# Patient Record
Sex: MALE | Race: WHITE
[De-identification: names, ages, dates, MRNs, and addresses within clinical notes are randomized per-mention and may not be internally consistent; named-entity substitution may affect disease eponyms.]

---

## 2017-03-09 ENCOUNTER — HOSPITAL ENCOUNTER (EMERGENCY)
Dept: HOSPITAL 47 - EC | Age: 27
Discharge: OTHER INSTITUTION | End: 2017-03-09
Payer: SELF-PAY

## 2017-03-09 VITALS — DIASTOLIC BLOOD PRESSURE: 84 MMHG | HEART RATE: 86 BPM | SYSTOLIC BLOOD PRESSURE: 109 MMHG

## 2017-03-09 VITALS — RESPIRATION RATE: 16 BRPM | TEMPERATURE: 97.3 F

## 2017-03-09 DIAGNOSIS — F17.200: ICD-10-CM

## 2017-03-09 DIAGNOSIS — Z88.0: ICD-10-CM
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
## 2017-03-09 DIAGNOSIS — S61.217A: Primary | ICD-10-CM

## 2017-03-09 PROCEDURE — 99283 EMERGENCY DEPT VISIT LOW MDM: CPT

## 2017-03-09 PROCEDURE — 12001 RPR S/N/AX/GEN/TRNK 2.5CM/<: CPT

## 2017-03-09 NOTE — ED
General Adult HPI





- General


Source: police, EMS


Mode of arrival: EMS


Limitations: no limitations





<Génesis Keller - Last Filed: 03/11/17 15:08>





- History of Present Illness


-: minutes(s)


Quality: sharp


Consistency: constant


Improves with: none


Worsens with: none





<Enmanuel Reece - Last Filed: 04/01/17 07:35>





- General


Chief complaint: Wound/Laceration


Stated complaint: laceration





- History of Present Illness


Initial comments: 





Patient is 26-year-old man who is in police custody.  He is brought to be 

evaluated for a laceration to his fifth digit sustained when he punched a window

, breaking it.  The patient denies foreign body sensation.  He states that he 

does have sensation to the digit. (Enmanuel Reece)





- Related Data


 Allergies











Allergy/AdvReac Type Severity Reaction Status Date / Time


 


Penicillins Allergy  Unknown Verified 03/09/17 02:45














Review of Systems


ROS Other: All systems not noted in ROS Statement are negative.





<Génesis Keller - Last Filed: 03/11/17 15:08>


ROS Other: All systems not noted in ROS Statement are negative.


Constitutional: Denies: fever, weakness


Respiratory: Denies: dyspnea


Cardiovascular: Denies: chest pain


Skin: Reports: as per HPI, other (Laceration)


Neurological: Denies: weakness, numbness, paresthesias





<Enmanuel Reece - Last Filed: 04/01/17 07:35>


ROS Statement: 


Those systems with pertinent positive or pertinent negative responses have been 

documented in the HPI.








Past Medical History


Past Medical History: No Reported History


History of Any Multi-Drug Resistant Organisms: None Reported


Past Surgical History: No Surgical Hx Reported


Past Psychological History: No Psychological Hx Reported


Smoking Status: Current every day smoker


Past Alcohol Use History: Occasional


Past Drug Use History: Marijuana





<Génesis Keller - Last Filed: 03/11/17 15:08>





General Exam


Limitations: no limitations





<Génesis Keller - Last Filed: 03/11/17 15:08>


General appearance: alert, in no apparent distress


Head exam: Present: atraumatic, normocephalic


  ** Left


Hand Wrist exam: Present: other (Laceration to the fifth digit.)


Neurosensory exam: Present: 2-point discrimination, radial nerve intact, ulnar 

nerve intact, median nerve intact


Vascular: Present: normal capillary refill, radial pulse (Normal), ulnar pulse (

Normal).  Absent: vascular compromise


Neurological exam: Present: alert.  Absent: motor sensory deficit


Skin exam: Present: other (Laceration as above)





<ChevyEnmanuel - Last Filed: 04/01/17 07:35>





Procedures





<Génesis Keller - Last Filed: 03/11/17 15:08>





<ChevyEnmanuel - Last Filed: 04/01/17 07:35>





- Procedures


Initial comment: 


The skin was anesthetized with 1% lidocaine. The laceration was then cleansed 

and irrigated with normal saline. The wound was inspected, and there was no 

evidence of injury to deep structures. No foreign body was noted in the wound. 

A total of 9 skin sutures were placed utilizing 5-0 Ethilon. Lacerations are 

approx 1 cm and 1 cm.





 (Génesis Keller)





Disposition





<Génesis Keller - Last Filed: 03/11/17 15:08>





- Out of Hospital Transfer - Req. Specs


Out of Hospital Transfer - Requested Specifics: Other Non-Acute (snf)





<ChevyEnmanuel - Last Filed: 04/01/17 07:35>


Clinical Impression: 


 Laceration





Disposition: OTHER INSTITUTION NOT DEFINED


Condition: Fair


Instructions:  Care For Your Stitches (ED), Laceration (ED)


Referrals: 


None,Stated [Primary Care Provider] - 1-2 days

## 2020-07-05 ENCOUNTER — HOSPITAL ENCOUNTER (EMERGENCY)
Dept: HOSPITAL 47 - EC | Age: 30
Discharge: HOME | End: 2020-07-05
Payer: COMMERCIAL

## 2020-07-05 VITALS — TEMPERATURE: 98.9 F

## 2020-07-05 VITALS — HEART RATE: 100 BPM | SYSTOLIC BLOOD PRESSURE: 145 MMHG | DIASTOLIC BLOOD PRESSURE: 94 MMHG

## 2020-07-05 VITALS — RESPIRATION RATE: 20 BRPM

## 2020-07-05 DIAGNOSIS — Z88.0: ICD-10-CM

## 2020-07-05 DIAGNOSIS — E86.0: ICD-10-CM

## 2020-07-05 DIAGNOSIS — F10.129: Primary | ICD-10-CM

## 2020-07-05 DIAGNOSIS — F17.200: ICD-10-CM
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
## 2020-07-05 PROCEDURE — 99284 EMERGENCY DEPT VISIT MOD MDM: CPT

## 2020-07-05 NOTE — ED
General Adult HPI





- General


Chief complaint: Recheck/Abnormal Lab/Rx


Stated complaint: Dehydrated


Time Seen by Provider: 07/05/20 12:50


Source: patient, RN notes reviewed, old records reviewed


Mode of arrival: ambulatory


Limitations: no limitations





- History of Present Illness


Initial comments: 





This is a 30-year-old male who presents emergency Department complaining that he

is extremely hung over thinks she's dehydrated.  Patient states she's very 

nauseated since been difficult to drink any water.  Patient does not want any IV

or needles to draw blood.  Patient states he just needs some for nausea.  

Patient denies any chest pain difficulty breathing first breath per patient 

denies any recent fever chills or cough.  Patient states he is doing outpatient 

rehab with [facility] but he is failing.





- Related Data


                                    Allergies











Allergy/AdvReac Type Severity Reaction Status Date / Time


 


Penicillins Allergy  Unknown Verified 07/05/20 12:52














Review of Systems


ROS Statement: 


Those systems with pertinent positive or pertinent negative responses have been 

documented in the HPI.





ROS Other: All systems not noted in ROS Statement are negative.





Past Medical History


Past Medical History: No Reported History


History of Any Multi-Drug Resistant Organisms: None Reported


Past Surgical History: No Surgical Hx Reported


Past Psychological History: No Psychological Hx Reported


Smoking Status: Current every day smoker


Past Alcohol Use History: Abuse, Heavy


Past Drug Use History: Marijuana





General Exam





- General Exam Comments


Initial Comments: 





GENERAL:


Patient is well-developed and well-nourished.  Patient is nontoxic and well-

hydrated and is in no acute distress.





ENT:


Neck is soft and supple.  No significant lymphadenopathy is noted.  Oropharynx 

is clear.  Dry mucous membranes.  Neck has full range of motion without 

eliciting any pain.  





EYES:


The sclera were anicteric and conjunctiva were pink and moist.  Extraocular 

movements were intact and pupils were equal round and reactive to light.  

Eyelids were unremarkable.





PULMONARY:


Unlabored respirations.  Good breath sounds bilaterally.  No audible rales 

rhonchi or wheezing was noted.





CARDIOVASCULAR:


There is a regular rate and rhythm without any murmurs gallops or rubs.  





ABDOMEN:


Soft and nontender with normal bowel sounds.  No palpable organomegaly was 

noted.  There is no palpable pulsatile mass.





SKIN:


Skin is clear with no lesions or rashes and otherwise unremarkable.





NEUROLOGIC:


Patient is alert and oriented x3.  Cranial nerves II through XII are grossly 

intact.  Motor and sensory are also intact.  Normal speech, volume and content. 

Symmetrical smile.  





MUSCULOSKELETAL:


Normal extremities with adequate strength and full range of motion.  





LYMPHATICS:


No significant lymphadenopathy is noted





PSYCHIATRIC:


Patient appears to be very anxious


Limitations: no limitations





Course





                                   Vital Signs











  07/05/20





  12:49


 


Temperature 98.9 F


 


Pulse Rate 104 H


 


Respiratory 18





Rate 


 


Blood Pressure 146/94


 


O2 Sat by Pulse 98





Oximetry 














Disposition


Clinical Impression: 


 Dehydration, Hangover, Alcohol abuse





Disposition: HOME SELF-CARE


Condition: Good


Instructions (If sedation given, give patient instructions):  Abuse of Alcohol 

(ED)


Is patient prescribed a controlled substance at d/c from ED?: No


Referrals: 


None,Stated [Primary Care Provider] - 1-2 days


Time of Disposition: 13:54

## 2021-04-21 ENCOUNTER — HOSPITAL ENCOUNTER (EMERGENCY)
Dept: HOSPITAL 47 - EC | Age: 31
Discharge: HOME | End: 2021-04-21
Payer: COMMERCIAL

## 2021-04-21 VITALS — HEART RATE: 108 BPM | DIASTOLIC BLOOD PRESSURE: 90 MMHG | SYSTOLIC BLOOD PRESSURE: 145 MMHG

## 2021-04-21 VITALS — TEMPERATURE: 98.7 F | RESPIRATION RATE: 18 BRPM

## 2021-04-21 DIAGNOSIS — F17.200: ICD-10-CM

## 2021-04-21 DIAGNOSIS — F12.90: ICD-10-CM

## 2021-04-21 DIAGNOSIS — Z88.0: ICD-10-CM

## 2021-04-21 DIAGNOSIS — F32.9: ICD-10-CM

## 2021-04-21 DIAGNOSIS — F10.139: Primary | ICD-10-CM

## 2021-04-21 DIAGNOSIS — Z20.822: ICD-10-CM

## 2021-04-21 DIAGNOSIS — F41.9: ICD-10-CM

## 2021-04-21 LAB
ALBUMIN SERPL-MCNC: 4.8 G/DL (ref 3.5–5)
ALP SERPL-CCNC: 98 U/L (ref 38–126)
ALT SERPL-CCNC: 182 U/L (ref 4–49)
AMYLASE SERPL-CCNC: 83 U/L (ref 30–110)
ANION GAP SERPL CALC-SCNC: 22 MMOL/L
AST SERPL-CCNC: 397 U/L (ref 17–59)
BASOPHILS # BLD AUTO: 0 K/UL (ref 0–0.2)
BASOPHILS NFR BLD AUTO: 0 %
BUN SERPL-SCNC: 21 MG/DL (ref 9–20)
CALCIUM SPEC-MCNC: 9.4 MG/DL (ref 8.4–10.2)
CHLORIDE SERPL-SCNC: 101 MMOL/L (ref 98–107)
CO2 SERPL-SCNC: 14 MMOL/L (ref 22–30)
EOSINOPHIL # BLD AUTO: 0 K/UL (ref 0–0.7)
EOSINOPHIL NFR BLD AUTO: 1 %
ERYTHROCYTE [DISTWIDTH] IN BLOOD BY AUTOMATED COUNT: 5.34 M/UL (ref 4.3–5.9)
ERYTHROCYTE [DISTWIDTH] IN BLOOD: 12.4 % (ref 11.5–15.5)
GLUCOSE SERPL-MCNC: 109 MG/DL (ref 74–99)
HCT VFR BLD AUTO: 50.9 % (ref 39–53)
HGB BLD-MCNC: 18.4 GM/DL (ref 13–17.5)
LIPASE SERPL-CCNC: 302 U/L (ref 23–300)
LYMPHOCYTES # SPEC AUTO: 1.2 K/UL (ref 1–4.8)
LYMPHOCYTES NFR SPEC AUTO: 15 %
MCH RBC QN AUTO: 34.4 PG (ref 25–35)
MCHC RBC AUTO-ENTMCNC: 36.1 G/DL (ref 31–37)
MCV RBC AUTO: 95.4 FL (ref 80–100)
MONOCYTES # BLD AUTO: 0.5 K/UL (ref 0–1)
MONOCYTES NFR BLD AUTO: 6 %
NEUTROPHILS # BLD AUTO: 6.1 K/UL (ref 1.3–7.7)
NEUTROPHILS NFR BLD AUTO: 77 %
PLATELET # BLD AUTO: 315 K/UL (ref 150–450)
POTASSIUM SERPL-SCNC: 4.1 MMOL/L (ref 3.5–5.1)
PROT SERPL-MCNC: 8.5 G/DL (ref 6.3–8.2)
SODIUM SERPL-SCNC: 137 MMOL/L (ref 137–145)
WBC # BLD AUTO: 7.9 K/UL (ref 3.8–10.6)

## 2021-04-21 PROCEDURE — 87635 SARS-COV-2 COVID-19 AMP PRB: CPT

## 2021-04-21 PROCEDURE — 82150 ASSAY OF AMYLASE: CPT

## 2021-04-21 PROCEDURE — 96376 TX/PRO/DX INJ SAME DRUG ADON: CPT

## 2021-04-21 PROCEDURE — 80320 DRUG SCREEN QUANTALCOHOLS: CPT

## 2021-04-21 PROCEDURE — 85025 COMPLETE CBC W/AUTO DIFF WBC: CPT

## 2021-04-21 PROCEDURE — 83690 ASSAY OF LIPASE: CPT

## 2021-04-21 PROCEDURE — 80053 COMPREHEN METABOLIC PANEL: CPT

## 2021-04-21 PROCEDURE — 96374 THER/PROPH/DIAG INJ IV PUSH: CPT

## 2021-04-21 PROCEDURE — 96375 TX/PRO/DX INJ NEW DRUG ADDON: CPT

## 2021-04-21 PROCEDURE — 96361 HYDRATE IV INFUSION ADD-ON: CPT

## 2021-04-21 PROCEDURE — 36415 COLL VENOUS BLD VENIPUNCTURE: CPT

## 2021-04-21 PROCEDURE — 99284 EMERGENCY DEPT VISIT MOD MDM: CPT

## 2021-04-21 NOTE — ED
General Adult HPI





- General


Chief complaint: Nausea/Vomiting/Diarrhea


Stated complaint: N/V/D


Time Seen by Provider: 04/21/21 11:38


Source: patient, RN notes reviewed


Mode of arrival: ambulatory


Limitations: no limitations





- History of Present Illness


Initial comments: 





31-year-old male with a past medical history of heavy alcohol abuse, marijuana 

use presents to the emergency room for a little nausea vomiting.  Patient states

he has had nausea and vomiting for the past 3 days.  He states that his drinking

is probably not helped.  States he usually sticks to drinking on the weekends 

but has drank the last 2 nights.  States of complaint of vodka last night.  

Patient denies any abdominal pain.  He does admit to a few episodes of diarrhea.

Patient has no other complaints at this time including shortness of breath, 

chest pain, abdominal pain, headache, or visual changes.





- Related Data


                                Home Medications











 Medication  Instructions  Recorded  Confirmed


 


Citalopram Hydrobromide 20 mg PO HS 04/21/21 04/21/21





[Citalopram HBr]   


 


atenoloL [Atenolol] 25 mg PO DAILY 04/21/21 04/21/21








                                  Previous Rx's











 Medication  Instructions  Recorded


 


Ondansetron [Zofran ODT] 4 mg PO Q8HR PRN #15 tab 04/21/21











                                    Allergies











Allergy/AdvReac Type Severity Reaction Status Date / Time


 


Penicillins Allergy  Unknown Verified 04/21/21 12:40














Review of Systems


ROS Statement: 


Those systems with pertinent positive or pertinent negative responses have been 

documented in the HPI.





ROS Other: All systems not noted in ROS Statement are negative.





Past Medical History


Past Medical History: No Reported History


History of Any Multi-Drug Resistant Organisms: None Reported


Past Surgical History: No Surgical Hx Reported


Past Psychological History: Anxiety, Depression


Smoking Status: Current every day smoker


Past Alcohol Use History: Abuse, Heavy


Past Drug Use History: Marijuana





General Exam


Limitations: no limitations


General appearance: alert, in no apparent distress


Head exam: Present: atraumatic, normocephalic, normal inspection


Eye exam: Present: normal appearance, PERRL, EOMI.  Absent: scleral icterus, 

conjunctival injection, periorbital swelling


ENT exam: Present: normal exam, mucous membranes moist


Neck exam: Present: normal inspection.  Absent: tenderness, meningismus, 

lymphadenopathy


Respiratory exam: Present: normal lung sounds bilaterally.  Absent: respiratory 

distress, wheezes, rales, rhonchi, stridor


Cardiovascular Exam: Present: regular rate, normal rhythm, normal heart sounds. 

Absent: systolic murmur, diastolic murmur, rubs, gallop, clicks


GI/Abdominal exam: Present: soft, normal bowel sounds.  Absent: distended, 

tenderness, guarding, rebound, rigid





Course


                                   Vital Signs











  04/21/21 04/21/21 04/21/21





  11:34 12:37 13:00


 


Temperature 98.7 F  


 


Pulse Rate 137 H 116 H 120 H


 


Respiratory 18 18 18





Rate   


 


Blood Pressure 134/91 139/105 141/102


 


O2 Sat by Pulse 96 96 96





Oximetry   














Medical Decision Making





- Medical Decision Making





Patient presents with tachycardia.  This is likely secondary to alcohol 

withdrawals.  Laboratory evaluation was obtained.  CBC does show 

hemoconcentration secondary to dehydration.  CMP also reveals evidence of dehydr

ation.  Transaminitis is likely secondary to alcohol abuse. AG of 22 likely 

related to alcoholic ketosis. Serum alcohol today is 152.  Patient was monitored

in the ER for over 3 hours.  He was given 2 L of fluid.  He was given 

antiemetics.  The vomiting has subsided.  Patient was given Ativan and Valium 

for withdrawal symptoms.  At this time patient is stable for discharge home.  

Will return here for any worsening symptoms.





I discussed this case with attending Dr. trent who agrees with this assessment 

and treatment plan.





- Lab Data


Result diagrams: 


                                 04/21/21 11:58





                                 04/21/21 11:58


                                   Lab Results











  04/21/21 04/21/21 04/21/21 Range/Units





  11:49 11:58 11:58 


 


WBC   7.9   (3.8-10.6)  k/uL


 


RBC   5.34   (4.30-5.90)  m/uL


 


Hgb   18.4 H   (13.0-17.5)  gm/dL


 


Hct   50.9   (39.0-53.0)  %


 


MCV   95.4   (80.0-100.0)  fL


 


MCH   34.4   (25.0-35.0)  pg


 


MCHC   36.1   (31.0-37.0)  g/dL


 


RDW   12.4   (11.5-15.5)  %


 


Plt Count   315   (150-450)  k/uL


 


MPV   7.1   


 


Neutrophils %   77   %


 


Lymphocytes %   15   %


 


Monocytes %   6   %


 


Eosinophils %   1   %


 


Basophils %   0   %


 


Neutrophils #   6.1   (1.3-7.7)  k/uL


 


Lymphocytes #   1.2   (1.0-4.8)  k/uL


 


Monocytes #   0.5   (0-1.0)  k/uL


 


Eosinophils #   0.0   (0-0.7)  k/uL


 


Basophils #   0.0   (0-0.2)  k/uL


 


Sodium    137  (137-145)  mmol/L


 


Potassium    4.1  (3.5-5.1)  mmol/L


 


Chloride    101  ()  mmol/L


 


Carbon Dioxide    14 L  (22-30)  mmol/L


 


Anion Gap    22  mmol/L


 


BUN    21 H  (9-20)  mg/dL


 


Creatinine    1.26 H  (0.66-1.25)  mg/dL


 


Est GFR (CKD-EPI)AfAm    89  (>60 ml/min/1.73 sqM)  


 


Est GFR (CKD-EPI)NonAf    77  (>60 ml/min/1.73 sqM)  


 


Glucose    109 H  (74-99)  mg/dL


 


Calcium    9.4  (8.4-10.2)  mg/dL


 


Total Bilirubin    1.0  (0.2-1.3)  mg/dL


 


AST    397 H  (17-59)  U/L


 


ALT    182 H  (4-49)  U/L


 


Alkaline Phosphatase    98  ()  U/L


 


Total Protein    8.5 H  (6.3-8.2)  g/dL


 


Albumin    4.8  (3.5-5.0)  g/dL


 


Amylase    83  ()  U/L


 


Lipase    302 H  ()  U/L


 


Serum Alcohol    152  mg/dL


 


Coronavirus (PCR)  Not Detected    (Not Detectd)  














Disposition


Clinical Impression: 


 Nausea & vomiting, Alcohol withdrawal





Disposition: HOME SELF-CARE


Condition: Good


Instructions (If sedation given, give patient instructions):  Acute Nausea and 

Vomiting (ED)


Additional Instructions: 


Please follow up with primary care in 1-2 days. Take nausea medicine as needed. 

Drink plenty of fluids. Return to the ER for any worsening symptoms.


Prescriptions: 


Ondansetron [Zofran ODT] 4 mg PO Q8HR PRN #15 tab


 PRN Reason: Nausea


Is patient prescribed a controlled substance at d/c from ED?: No


Referrals: 


Abdias Urrutia MD [Primary Care Provider] - 1-2 days


Time of Disposition: 14:41

## 2023-11-03 NOTE — ED
General Adult HPI





- General


Source: patient, RN notes reviewed, old records reviewed


Mode of arrival: ambulatory


Limitations: no limitations





<Evan Abrams - Last Filed: 11/03/23 14:57>





- History of Present Illness


-: days(s)


Consistency: constant


Improves with: none


Worsens with: none


Treatments Prior to Arrival: none





<José Miguel Greene - Last Filed: 11/05/23 23:47>





- General


Chief complaint: Psychiatric Symptoms


Stated complaint: Mental Health


Time Seen by Provider: 11/03/23 09:51





- History of Present Illness


Initial comments: 





Patient is a 33-year-old male who presents emergency Department complaining of 

psychiatric complaints.  He is feeling suicidal.  Recently completed withdrawing

from alcohol.  No longer having symptoms.  States he is having thoughts of 

hurting himself and occasionally others.  No plans or attempts.  Endorses 

worsening depression.  His home stressors.  No other acute complaints at this 

time.  Presents for further evaluation at this time. (Evan Abrams)


33 male DF for psychiatric evaluation secondary to suicidal thoughts.  Severe 

depression with acute on stressors recently which then went to detox from 

alcohol (José Miguel Greene)





- Related Data


                                Home Medications











 Medication  Instructions  Recorded  Confirmed


 


Fenofibrate,Micronized 134 mg PO DAILY 11/03/23 11/03/23





[Fenofibrate]   


 


Mag Hydrox/Aluminum Hyd/Simeth 10 - 20 ml PO ACHS PRN 11/03/23 11/03/23





[Mylanta Maximum Strength Liq]   


 


Multivitamins, Thera [Multivitamin 1 tab PO DAILY 11/03/23 11/03/23





(formulary)]   


 


Vitamin B Complex 1 cap PO DAILY 11/03/23 11/03/23


 


traZODone HCL [Desyrel] 50 mg PO HS PRN 11/04/23 11/04/23








                                  Previous Rx's











 Medication  Instructions  Recorded


 


Metoprolol Succinate (ER) [Toprol 25 mg PO DAILY #30 tab 11/04/23





Xl]  











                                    Allergies











Allergy/AdvReac Type Severity Reaction Status Date / Time


 


Penicillins Allergy  Rash/Hives Verified 11/03/23 18:19














Review of Systems


ROS Other: All systems not noted in ROS Statement are negative.





<Evan Abrams - Last Filed: 11/03/23 14:57>


ROS Other: All systems not noted in ROS Statement are negative.





<José Miguel Greene - Last Filed: 11/05/23 23:47>


ROS Statement: 


Those systems with pertinent positive or pertinent negative responses have been 

documented in the HPI.


Review of Systems:


CONST: Denies fever 


EYES: Denies blurry vision 


ENT: Denies nasal congestion  


C/V:  Denies Chest pain


RESP: Denies shortness of breath 


GI: Denies abdominal pain 


: Denies dysuria  


SKIN: Denies rash.


MSK: Denies joint pain.


NEURO: Denies headache 


PSYCH: Denies suicidal and homicidal plans/attempts but endorses ideations.  

Denies visual or auditory hallucinations. (Evan Abrams)





Past Medical History


Past Medical History: Hypertension


Additional Past Medical History / Comment(s): Etoh abuse


History of Any Multi-Drug Resistant Organisms: None Reported


Past Surgical History: No Surgical Hx Reported


Past Psychological History: Anxiety, Depression


Smoking Status: Current every day smoker


Past Alcohol Use History: Abuse, Daily, Heavy


Past Drug Use History: Marijuana





<Evan Abrams - Last Filed: 11/03/23 14:57>





General Exam


Limitations: no limitations





<Evan Abrams - Last Filed: 11/03/23 14:57>


General appearance: alert, in no apparent distress, anxious, in distress


Head exam: Present: atraumatic, normocephalic, normal inspection


Eye exam: Present: normal appearance, PERRL, EOMI.  Absent: scleral icterus, 

conjunctival injection, periorbital swelling


ENT exam: Present: normal exam, mucous membranes moist


Neck exam: Present: normal inspection.  Absent: tenderness, meningismus, 

lymphadenopathy


Respiratory exam: Present: normal lung sounds bilaterally.  Absent: respiratory 

distress, wheezes, rales, rhonchi, stridor


Cardiovascular Exam: Present: regular rate, normal rhythm, normal heart sounds. 

Absent: systolic murmur, diastolic murmur, rubs, gallop, clicks


GI/Abdominal exam: Present: soft, normal bowel sounds.  Absent: distended, 

tenderness, guarding, rebound, rigid


Extremities exam: Present: normal inspection, full ROM, normal capillary refill.

 Absent: tenderness, pedal edema, joint swelling, calf tenderness


Back exam: Present: normal inspection


Neurological exam: Present: alert, oriented X3, CN II-XII intact


Psychiatric exam: Present: normal affect, normal mood


Skin exam: Present: warm, dry, intact, normal color.  Absent: rash





<José Miguel Greene - Last Filed: 11/05/23 23:47>





- General Exam Comments


Initial Comments: 





General: Appears in no acute distress.  No evidence of alcohol withdrawals at 

this time.


HEAD:  Normal with no signs of head trauma.


EYES:  PERRLA, EOMI, conjunctiva normal, no discharge.


ENT:  Hearing grossly intact, normal oropharynx.


RESPIRATORY:  Clear breath sounds bilaterally.  No wheezes, rales, or rhonchi.  


C/V:  Regular rate and rhythm. S1 and S2 auscultated, no edema, peripheral 

pulses 2+ and intact throughout


ABD:  Abd is soft, nontender, nondistended


EXT: Normal range of motion, no obvious deformity


SKIN:  No rashes or lesions observed on exposed skin.


NEURO: Alert and oriented x 4.   (Evan Abrams)





Course





<José Miguel Greene - Last Filed: 11/05/23 23:47>


                                   Vital Signs











  11/03/23 11/03/23





  09:47 21:27


 


Temperature 97.7 F 


 


Pulse Rate 70 92


 


Respiratory 20 18





Rate  


 


Blood Pressure 129/94 138/94


 


O2 Sat by Pulse 99 98





Oximetry  














- Reevaluation(s)


Reevaluation #1: 





11/03/23 20:40


Medical records reviewed (José Miguel Greene)


Reevaluation #2: 





11/03/23 20:40


Patient is depressed and medical clear for psychiatric evaluation 

(José Miguel Greene)


Reevaluation #3: 





11/03/23 20:40


Patient informed results questions answered (José Miguel Greene)


Reevaluation #4: 





11/03/23 20:40


Was pt. sent in by a medical professional or institution (, PA, NP, urgent 

care, hospital, or nursing home...) When possible be specific


@  -no


Did you speak to anyone other than the patient for history (EMS, parent, family,

police, friend...)? What history was obtained from this source 


@  -no


Did you review nursing and triage notes (agree or disagree)?  Why? 


@  -agree


Are old charts reviewed (outside hosp., previous admission, EMS record, old EKG,

old radiological studies, urgent care reports/EKG's, nursing home records)? 

Report findings 


@  -yes


Differential Diagnosis (chest pain, altered mental status, abdominal pain women,

abdominal pain men, vaginal bleeding, weakness, fever, dyspnea, syncope, 

headache, dizziness, GI bleed, back pain, seizure, CVA, palpatations, mental 

health, musculoskeletal)? 


@  -prior


EKG interpreted by me (3pts min.).


@  -no


X-rays interpreted by me (1pt min.).


@  -no


CT interpreted by me (1pt min.).


@  -no


U/S interpreted by me (1pt. min.).


@  -no


What testing was considered but not performed or refused? (CT, X-rays, U/S, 

labs)? Why?


@  -none


What meds were considered but not given or refused? Why?


@  -none


Did you discuss the management of the patient with other professionals 

(professionals i.e. , PA, NP, lab, RT, psych nurse, , , 

teacher, , )? Give summary


@  -no


Was smoking cessation discussed for >3mins.?


@  -no


Was critical care preformed (if so, how long)?


@  -no


Were there social determinants of health that impacted care today? How? 

(Homelessness, low income, unemployed, alcoholism, drug addiction, 

transportation, low edu. Level, literacy, decrease access to med. care, penitentiary, 

rehab)?


@  -none


Was there de-escalation of care discussed even if they declined (Discuss DNR or 

withdrawal of care, Hospice)? DNR status


@  -no


What co-morbidities impacted this encounter? (DM, HTN, Smoking, COPD, CAD, 

Cancer, CVA, ARF, Chemo, Hep., AIDS, mental health diagnosis, sleep apnea, 

morbid obesity)?


@  -none


Was patient admitted / discharged? Hospital course, mention meds given and 

route, prescriptions, significant lab abnormalities, going to OR and other 

pertinent info.


@  - 33 male to the emergency department for evaluation not stable for transfer 

to psychiatric inpatient, has been tonight secondary to alcoholic hepatitis.  

Patient will be admitted for psychiatric evaluation and suicidal ideation 

management


Admitted


Undiagnosed new problem with uncertain prognosis?


@  -no


Drug Therapy requiring intensive monitoring for toxicity (Heparin, Nitro, 

Insulin, Cardizem)?


@  -no


Were any procedures done?


@  -no


Diagnosis/symptom?


@  -Occult hepatitis, suicidal ideation


Acute, or Chronic, or Acute on Chronic?


@  -Acute


Uncomplicated (without systemic symptoms) or Complicated (systemic symptoms)?


@  -Complicated


Side effects of treatment?


@  -no


Exacerbation, Progression, or Severe Exacerbation?


@  -exacerbation


Poses a threat to life or bodily function? How? (Chest pain, USA, MI, pneumonia,

PE, COPD, DKA, ARF, appy, cholecystitis, CVA, Diverticulitis, Homicidal, 

Suicidal, threat to staff... and all critical care pts)


@  -yes (José Miguel Greene)





Medical Decision Making





<Evan Abrams - Last Filed: 11/03/23 14:57>





- Lab Data


Result diagrams: 


                                 11/04/23 04:41





                                 11/04/23 04:41





<José Miguel Greene - Last Filed: 11/05/23 23:47>





- Medical Decision Making





Was pt. sent in by a medical professional or institution (, PA, NP, urgent 

care, hospital, or nursing home...) When possible be specific


@  -No


Did you speak to anyone other than the patient for history (EMS, parent, family,

police, friend...)? What history was obtained from this source 


@  -No


Did you review nursing and triage notes (agree or disagree)?  Why? 


@  -I reviewed and agree with nursing and triage notes


Were old charts reviewed (outside hosp., previous admission, EMS record, old 

EKG, old radiological studies, urgent care reports/EKG's, nursing home records)?

Report findings 


@  -No old charts were reviewed


Differential Diagnosis (chest pain, altered mental status, abdominal pain women,

abdominal pain men, vaginal bleeding, weakness, fever, dyspnea, syncope, 

headache, dizziness, GI bleed, back pain, seizure, CVA, palpatations, mental 

health, musculoskeletal)? 


@  -Differential Mental Health


Depression, anxiety, bipolar, psychosis, schizophrenia, borderline personality, 

situational depression, adjustment disorder, behavioral disorder, brain tumor, 

malingering, substance abuse, encephalopathy, medication reaction, dementia, 

hypothyroidism, degenerative neurologic disorder, lupus.... This is not meant to

be all-inclusive list


EKG interpreted by me (3pts min.).


@  -None done


X-rays interpreted by me (1pt min.).


@  -None done


CT interpreted by me (1pt min.).


@  -None done


U/S interpreted by me (1pt. min.).


@  -None done


What testing was considered but not performed or refused? (CT, X-rays, U/S, 

labs)? Why?


@  -None


What meds were considered but not given or refused? Why?


@  -None


Did you discuss the management of the patient with other professionals 

(professionals i.e. Dr., PA, NP, lab, RT, psych nurse, , , 

teacher, , )? Give summary


@  -No


Was smoking cessation discussed for >3mins.?


@  -No


Was critical care preformed (if so, how long)?


@  -No


Were there social determinants of health that impacted care today? How? 

(Homelessness, low income, unemployed, alcoholism, drug addiction, [place]

sportation, low edu. Level, literacy, decrease access to med. care, penitentiary, 

rehab)?


@  -No


Was there de-escalation of care discussed even if they declined (Discuss DNR or 

withdrawal of care, Hospice)? DNR status


@  -No


What co-morbidities impacted this encounter? (DM, HTN, Smoking, COPD, CAD, 

Cancer, CVA, ARF, Chemo, Hep., AIDS, mental health diagnosis, sleep apnea, 

morbid obesity)?


@  -None


Was patient admitted / discharged? Hospital course, mention meds given and 

route, prescriptions, significant lab abnormalities, going to OR and other 

pertinent info.


@  -Based on patient's presentation and physical exam, presents for psychiatric 

evaluation.  Vital signs within acceptable limits.  Patient placed in green 

scrubs.  Sitter and suicide precautions ordered.  BAT is 0.  UDS is pending.





At this time patient medically cleared for evaluation by psychiatry.  Disposi

tion pending psychiatric evaluation.  EPS notified for consult.





EPS Abby updated me the patient is pending inpatient admission.  Patient be

transferred for psychiatric admission.  Clinical certificate completed by 

myself.


Undiagnosed new problem with uncertain prognosis?


@  -No


Drug Therapy requiring intensive monitoring for toxicity (Heparin, Nitro, 

Insulin, Cardizem)?


@  -No


Were any procedures done?


@  -No


Diagnosis/symptom?


@  -Encounter for psychiatric evaluation, suicidal ideations


Acute, or Chronic, or Acute on Chronic?


@  -Acute


Uncomplicated (without systemic symptoms) or Complicated (systemic symptoms)?


@  -Uncomplicated


Side effects of treatment?


@  -No


Exacerbation, Progression, or Severe Exacerbation?


@  -No


Poses a threat to life or bodily function? How? (Chest pain, USA, MI, pneumonia,

PE, COPD, DKA, ARF, appy, cholecystitis, CVA, Diverticulitis, Homicidal, 

Suicidal, threat to staff... and all critical care pts)


@  -Yes (Evan Abrams)


33 male who will be admitted for psychiatric evaluation and treatment patient 

does have significant alcoholic hepatitis and doesn't admit for transfer to 

outside facility.  Due to patient's complicated condition patient will be 

admitted here for psychiatric evaluation and medical evaluation of hepatitis 

(José Miguel Greene)





- Lab Data


                                   Lab Results











  11/03/23 11/03/23 11/03/23 Range/Units





  15:51 15:51 15:51 


 


WBC   8.9   (3.8-10.6)  k/uL


 


RBC   5.42   (4.30-5.90)  m/uL


 


Hgb   18.2 H   (13.0-17.5)  gm/dL


 


Hct   52.5   (39.0-53.0)  %


 


MCV   96.9   (80.0-100.0)  fL


 


MCH   33.7   (25.0-35.0)  pg


 


MCHC   34.7   (31.0-37.0)  g/dL


 


RDW   13.1   (11.5-15.5)  %


 


Plt Count   260   (150-450)  k/uL


 


MPV   7.4   


 


Sodium     (137-145)  mmol/L


 


Potassium     (3.5-5.1)  mmol/L


 


Chloride     ()  mmol/L


 


Carbon Dioxide     (22-30)  mmol/L


 


Anion Gap     mmol/L


 


BUN     (9-20)  mg/dL


 


Creatinine     (0.66-1.25)  mg/dL


 


Est GFR (CKD-EPI)AfAm     (>60 ml/min/1.73 sqM)  


 


Est GFR (CKD-EPI)NonAf     (>60 ml/min/1.73 sqM)  


 


Glucose     (74-99)  mg/dL


 


Calcium     (8.4-10.2)  mg/dL


 


Total Bilirubin     (0.2-1.3)  mg/dL


 


AST     (17-59)  U/L


 


ALT     (4-49)  U/L


 


Alkaline Phosphatase     ()  U/L


 


Total Protein     (6.3-8.2)  g/dL


 


Albumin     (3.5-5.0)  g/dL


 


Urine Color    Yellow  


 


Urine Appearance    Clear  (Clear)  


 


Urine pH    6.0  (5.0-8.0)  


 


Ur Specific Gravity    1.029  (1.001-1.035)  


 


Urine Protein    1+ H  (Negative)  


 


Urine Glucose (UA)    Negative  (Negative)  


 


Urine Ketones    Negative  (Negative)  


 


Urine Blood    Negative  (Negative)  


 


Urine Nitrite    Negative  (Negative)  


 


Urine Bilirubin    Negative  (Negative)  


 


Urine Urobilinogen    3.0  (<2.0)  mg/dL


 


Ur Leukocyte Esterase    Negative  (Negative)  


 


Urine RBC    1  (0-5)  /hpf


 


Urine WBC    <1  (0-5)  /hpf


 


Urine Mucus    Many H  (None)  /hpf


 


Urine Opiates Screen  Not Detected    (NotDetected)  


 


Ur Oxycodone Screen  Not Detected    (NotDetected)  


 


Urine Methadone Screen  Not Detected    (NotDetected)  


 


Ur Propoxyphene Screen  Not Detected    (NotDetected)  


 


Ur Barbiturates Screen  Not Detected    (NotDetected)  


 


U Tricyclic Antidepress  Not Detected    (NotDetected)  


 


Ur Phencyclidine Scrn  Not Detected    (NotDetected)  


 


Ur Amphetamines Screen  Not Detected    (NotDetected)  


 


U Methamphetamines Scrn  Not Detected    (NotDetected)  


 


U Benzodiazepines Scrn  Not Detected    (NotDetected)  


 


Urine Cocaine Screen  Not Detected    (NotDetected)  


 


U Marijuana (THC) Screen  Detected H    (NotDetected)  


 


Coronavirus (PCR)     (Not Detectd)  














  11/03/23 11/03/23 Range/Units





  15:51 15:51 


 


WBC    (3.8-10.6)  k/uL


 


RBC    (4.30-5.90)  m/uL


 


Hgb    (13.0-17.5)  gm/dL


 


Hct    (39.0-53.0)  %


 


MCV    (80.0-100.0)  fL


 


MCH    (25.0-35.0)  pg


 


MCHC    (31.0-37.0)  g/dL


 


RDW    (11.5-15.5)  %


 


Plt Count    (150-450)  k/uL


 


MPV    


 


Sodium  134 L   (137-145)  mmol/L


 


Potassium  4.2   (3.5-5.1)  mmol/L


 


Chloride  100   ()  mmol/L


 


Carbon Dioxide  23   (22-30)  mmol/L


 


Anion Gap  11   mmol/L


 


BUN  16   (9-20)  mg/dL


 


Creatinine  1.14   (0.66-1.25)  mg/dL


 


Est GFR (CKD-EPI)AfAm  >90   (>60 ml/min/1.73 sqM)  


 


Est GFR (CKD-EPI)NonAf  86   (>60 ml/min/1.73 sqM)  


 


Glucose  107 H   (74-99)  mg/dL


 


Calcium  9.5   (8.4-10.2)  mg/dL


 


Total Bilirubin  1.4 H   (0.2-1.3)  mg/dL


 


AST  228 H   (17-59)  U/L


 


ALT  148 H   (4-49)  U/L


 


Alkaline Phosphatase  76   ()  U/L


 


Total Protein  8.2   (6.3-8.2)  g/dL


 


Albumin  4.4   (3.5-5.0)  g/dL


 


Urine Color    


 


Urine Appearance    (Clear)  


 


Urine pH    (5.0-8.0)  


 


Ur Specific Gravity    (1.001-1.035)  


 


Urine Protein    (Negative)  


 


Urine Glucose (UA)    (Negative)  


 


Urine Ketones    (Negative)  


 


Urine Blood    (Negative)  


 


Urine Nitrite    (Negative)  


 


Urine Bilirubin    (Negative)  


 


Urine Urobilinogen    (<2.0)  mg/dL


 


Ur Leukocyte Esterase    (Negative)  


 


Urine RBC    (0-5)  /hpf


 


Urine WBC    (0-5)  /hpf


 


Urine Mucus    (None)  /hpf


 


Urine Opiates Screen    (NotDetected)  


 


Ur Oxycodone Screen    (NotDetected)  


 


Urine Methadone Screen    (NotDetected)  


 


Ur Propoxyphene Screen    (NotDetected)  


 


Ur Barbiturates Screen    (NotDetected)  


 


U Tricyclic Antidepress    (NotDetected)  


 


Ur Phencyclidine Scrn    (NotDetected)  


 


Ur Amphetamines Screen    (NotDetected)  


 


U Methamphetamines Scrn    (NotDetected)  


 


U Benzodiazepines Scrn    (NotDetected)  


 


Urine Cocaine Screen    (NotDetected)  


 


U Marijuana (THC) Screen    (NotDetected)  


 


Coronavirus (PCR)   Not Detected  (Not Detectd)  














Disposition





<Evan Abrams - Last Filed: 11/03/23 14:57>


Is patient prescribed a controlled substance at d/c from ED?: No


Time of Disposition: 20:40





<José Miguel Greene - Last Filed: 11/05/23 23:47>


Clinical Impression: 


 Encounter for psychological evaluation, Suicidal ideation, Alcoholic hepatitis,

Depression





Disposition: ADMITTED AS IP TO THIS HOSP


Condition: Fair

## 2023-11-04 NOTE — P.DS
Providers


Date of admission: 


11/03/23 20:37





Attending physician: 


Carlos Gallego





Consults: 





                                        





11/03/23 20:37


Consult Physician Routine 


   Consulting Provider: Noe Markham


   Consult Reason/Comments: suicidal


   Do you want consulting provider notified?: Yes











Primary care physician: 


Shaggy Thakkar





[facility] Course: 





Patient is a 33-year-old pleasant male came in for alcohol withdrawals.  Patient

to stop drinking more than 2 days ago has been undergoing withdrawals.  Although

today he only received the 1 mg 1 time dose of Ativan so far since 

hospitalization.  Patient withdraws significant improved.  Patient is also 

depressed always has thoughts of doesn't want to live but doesn't have a plan 

for sweets side never attempted suicide.


Patient does have history of hypertension and is on atenolol at home but his 

blood pressure is low normal patient is bit tachycardic at this time patient is 

receiving IV fluids denied any abdominal pain nausea vomiting patient drinks 

usually one fifth of alcohol on daily basis attempted to quit many times was 

unable to quit by himself.  He does smoke marijuana occasionally











REVIEW OF SYSTEMS: 


CONSTITUTIONAL: No fever, no malaise, no fatigue. 


HEENT: No recent visual problems or hearing problems. Denied any sore throat. 


CARDIOVASCULAR: No chest pain, orthopnea, PND, no palpitations, no syncope. 


PULMONARY: No shortness of breath, no cough, no hemoptysis. 


GASTROINTESTINAL: No diarrhea, no nausea, no vomiting, no abdominal pain. 


NEUROLOGICAL: No headaches, no weakness, no numbness. 


HEMATOLOGICAL: Denies any bleeding or petechiae. 


GENITOURINARY: Denies any burning micturition, frequency, or urgency. 


MUSCULOSKELETAL/RHEUMATOLOGICAL: Denies any joint pain, swelling, or any muscle 

pain. 


ENDOCRINE: Denies any polyuria or polydipsia. 





The rest of the 14-point review of systems is negative.











PHYSICAL EXAMINATION: 





GENERAL: The patient is alert and oriented x3, not in any acute distress. Well 

developed, well nourished. 


HEENT: Pupils are round and equally reacting to light. EOMI. No scleral icterus.

No conjunctival pallor. Normocephalic, atraumatic. No pharyngeal erythema. No 

thyromegaly. 


CARDIOVASCULAR: S1 and S2 present. No murmurs, rubs, or gallops. 


PULMONARY: Chest is clear to auscultation, no wheezing or crackles. 


ABDOMEN: Soft, nontender, nondistended, normoactive bowel sounds. No palpable 

organomegaly. 


MUSCULOSKELETAL: No joint swelling or deformity.


EXTREMITIES: No cyanosis, clubbing, or pedal edema. 


NEUROLOGICAL: Gross neurological examination did not reveal any focal deficits. 


SKIN: No rashes. 





Assessment and plan


-Alcohol withdrawals which improved patient will be monitored until later in the

day when we and will be discharged on as needed Librium.  As there is a concern 

about depression and trying to hurt himself patient will need clearance from 

psychiatry before can be discharged.


-Alcohol abuse: Counseling was provided and the  will be consulted 

to provide him with the resources for cessation of alcohol


-Tachycardia part of which is reflex tachycardia as he didn't receive his 

atenolol at this morning has patient's blood pressures his low switched to 

metoprolol


-Hypertension presently hypotensive


-Hyperlipidemia


-Obesity


Depression: Management as mentioned above


-Acute alcoholic hepatitis expected to improve with cessation of alcohol


-Hypervolemic hyponatremia improved with IV fluids





Patient will be discharged today if cleared by psychiatry and after evaluation 

with social work


Patient Condition at Discharge: Fair





Plan - Discharge Summary


New Discharge Prescriptions: 


New


   chlordiazePOXIDE HCl [Librium] 25 mg PO QID 3 Days #12 capsule


   Metoprolol Succinate (ER) [Toprol Xl] 25 mg PO DAILY #30 tab





Continue


   Multivitamins, Thera [Multivitamin (formulary)] 1 tab PO DAILY


   Fenofibrate,Micronized [Fenofibrate] 134 mg PO DAILY


   Trazodone (Unknown Dose) 1 tab PO HS PRN


     PRN Reason: Insomnia


   Vitamin B Complex 1 cap PO DAILY


   Mag Hydrox/Aluminum Hyd/Simeth [Mylanta Maximum Strength Liq] 10 - 20 ml PO 

ACHS PRN


     PRN Reason: Acid indigestion





Discontinued


   atenoloL 25 mg PO DAILY


Discharge Medication List





Fenofibrate,Micronized [Fenofibrate] 134 mg PO DAILY 11/03/23 [History]


Mag Hydrox/Aluminum Hyd/Simeth [Mylanta Maximum Strength Liq] 10 - 20 ml PO ACHS

PRN 11/03/23 [History]


Multivitamins, Thera [Multivitamin (formulary)] 1 tab PO DAILY 11/03/23 

[History]


Trazodone (Unknown Dose) 1 tab PO HS PRN 11/03/23 [History]


Vitamin B Complex 1 cap PO DAILY 11/03/23 [History]


Metoprolol Succinate (ER) [Toprol Xl] 25 mg PO DAILY #30 tab 11/04/23 [Rx]


chlordiazePOXIDE HCl [Librium] 25 mg PO QID 3 Days #12 capsule 11/04/23 [Rx]








Follow up Appointment(s)/Referral(s): 


Shaggy Thakkar MD [Primary Care Provider] - 3 Days


Discharge Disposition: HOME SELF-CARE

## 2023-11-04 NOTE — P.HPIM
History of Present Illness


Patient is a 33-year-old pleasant male came in for alcohol withdrawals.  Patient

to stop drinking more than 2 days ago has been undergoing withdrawals.  Although

today he only received the 1 mg 1 time dose of Ativan so far since hosp

italization.  Patient withdraws significant improved.  Patient is also depressed

always has thoughts of doesn't want to live but doesn't have a plan for sweets 

side never attempted suicide.


Patient does have history of hypertension and is on atenolol at home but his 

blood pressure is low normal patient is bit tachycardic at this time patient is 

receiving IV fluids denied any abdominal pain nausea vomiting patient drinks 

usually one fifth of alcohol on daily basis attempted to quit many times was 

unable to quit by himself.  He does smoke marijuana occasionally











REVIEW OF SYSTEMS: 


CONSTITUTIONAL: No fever, no malaise, no fatigue. 


HEENT: No recent visual problems or hearing problems. Denied any sore throat. 


CARDIOVASCULAR: No chest pain, orthopnea, PND, no palpitations, no syncope. 


PULMONARY: No shortness of breath, no cough, no hemoptysis. 


GASTROINTESTINAL: No diarrhea, no nausea, no vomiting, no abdominal pain. 


NEUROLOGICAL: No headaches, no weakness, no numbness. 


HEMATOLOGICAL: Denies any bleeding or petechiae. 


GENITOURINARY: Denies any burning micturition, frequency, or urgency. 


MUSCULOSKELETAL/RHEUMATOLOGICAL: Denies any joint pain, swelling, or any muscle 

pain. 


ENDOCRINE: Denies any polyuria or polydipsia. 





The rest of the 14-point review of systems is negative.











PHYSICAL EXAMINATION: 





GENERAL: The patient is alert and oriented x3, not in any acute distress. Well 

developed, well nourished. 


HEENT: Pupils are round and equally reacting to light. EOMI. No scleral icterus.

No conjunctival pallor. Normocephalic, atraumatic. No pharyngeal erythema. No 

thyromegaly. 


CARDIOVASCULAR: S1 and S2 present. No murmurs, rubs, or gallops. 


PULMONARY: Chest is clear to auscultation, no wheezing or crackles. 


ABDOMEN: Soft, nontender, nondistended, normoactive bowel sounds. No palpable 

organomegaly. 


MUSCULOSKELETAL: No joint swelling or deformity.


EXTREMITIES: No cyanosis, clubbing, or pedal edema. 


NEUROLOGICAL: Gross neurological examination did not reveal any focal deficits. 


SKIN: No rashes. 





Assessment and plan


-Alcohol withdrawals which improved patient will be monitored until later in the

day when we and will be discharged on as needed Librium.  As there is a concern 

about depression and trying to hurt himself patient will need clearance from 

psychiatry before can be discharged.


-Alcohol abuse: Counseling was provided and the  will be consulted 

to provide him with the resources for cessation of alcohol


-Tachycardia part of which is reflex tachycardia as he didn't receive his 

atenolol at this morning has patient's blood pressures his low switched to 

metoprolol


-Hypertension presently hypotensive


-Hyperlipidemia


-Obesity


Depression: Management as mentioned above


-Acute alcoholic hepatitis expected to improve with cessation of alcohol


-Hypervolemic hyponatremia improved with IV fluids





Patient will be discharged today if cleared by psychiatry and after evaluation 

with social work








Past Medical History


Past Medical History: Hyperlipidemia, Hypertension


Additional Past Medical History / Comment(s): Etoh abuse


History of Any Multi-Drug Resistant Organisms: None Reported


Past Surgical History: No Surgical Hx Reported


Past Psychological History: Anxiety, Depression


Smoking Status: Current every day smoker


Past Alcohol Use History: Abuse, Daily, Heavy


Additional Past Alcohol Use History / Comment(s): fith to fith and half


Past Drug Use History: Marijuana





Medications and Allergies


                                Home Medications











 Medication  Instructions  Recorded  Confirmed  Type


 


Fenofibrate,Micronized 134 mg PO DAILY 11/03/23 11/03/23 History





[Fenofibrate]    


 


Mag Hydrox/Aluminum Hyd/Simeth 10 - 20 ml PO ACHS PRN 11/03/23 11/03/23 History





[Mylanta Maximum Strength Liq]    


 


Multivitamins, Thera [Multivitamin 1 tab PO DAILY 11/03/23 11/03/23 History





(formulary)]    


 


Trazodone (Unknown Dose) 1 tab PO HS PRN 11/03/23 11/03/23 History


 


Vitamin B Complex 1 cap PO DAILY 11/03/23 11/03/23 History


 


Metoprolol Succinate (ER) [Toprol 25 mg PO DAILY #30 tab 11/04/23  Rx





Xl]    


 


chlordiazePOXIDE HCl [Librium] 25 mg PO QID 3 Days #12 capsule 11/04/23  Rx








                                    Allergies











Allergy/AdvReac Type Severity Reaction Status Date / Time


 


Penicillins Allergy  Rash/Hives Verified 11/03/23 18:19














Physical Exam


Vitals: 


                                   Vital Signs











  Temp Pulse Pulse Resp BP BP Pulse Ox


 


 11/04/23 04:14  98.0 F   106 H  20   111/84  98


 


 11/03/23 21:45  98.1 F   103 H  16   135/93  96


 


 11/03/23 21:27   92   18  138/94   98


 


 11/03/23 09:47  97.7 F  70   20  129/94   99








                                Intake and Output











 11/03/23 11/04/23 11/04/23





 22:59 06:59 14:59


 


Other:   


 


  # Voids 1 2 


 


  Weight 127.006 kg  














Results


CBC & Chem 7: 


                                 11/04/23 04:41





                                 11/04/23 04:41


Labs: 


                  Abnormal Lab Results - Last 24 Hours (Table)











  11/03/23 11/03/23 11/03/23 Range/Units





  15:51 15:51 15:51 


 


Hgb   18.2 H   (13.0-17.5)  gm/dL


 


MCV     (80.0-100.0)  fL


 


Sodium     (137-145)  mmol/L


 


Glucose     (74-99)  mg/dL


 


Total Bilirubin     (0.2-1.3)  mg/dL


 


AST     (17-59)  U/L


 


ALT     (4-49)  U/L


 


Urine Protein    1+ H  (Negative)  


 


Urine Mucus    Many H  (None)  /hpf


 


U Marijuana (THC) Screen  Detected H    (NotDetected)  














  11/03/23 11/04/23 11/04/23 Range/Units





  15:51 04:41 04:41 


 


Hgb     (13.0-17.5)  gm/dL


 


MCV   100.6 H   (80.0-100.0)  fL


 


Sodium  134 L   135 L  (137-145)  mmol/L


 


Glucose  107 H   101 H  (74-99)  mg/dL


 


Total Bilirubin  1.4 H    (0.2-1.3)  mg/dL


 


AST  228 H   258 H  (17-59)  U/L


 


ALT  148 H   157 H  (4-49)  U/L


 


Urine Protein     (Negative)  


 


Urine Mucus     (None)  /hpf


 


U Marijuana (THC) Screen     (NotDetected)  














Thrombosis Risk Factor Assmnt





- Choose All That Apply


Any of the Below Risk Factors Present?: Yes


Each Factor Represents 1 point: Obesity (BMI >25)


Other Risk Factors: No


Other congenital or acquired thrombophilia - If yes, enter type in comment: No


Thrombosis Risk Factor Assessment Total Risk Factor Score: 1


Thrombosis Risk Factor Assessment Level: Low Risk

## 2023-11-04 NOTE — P.CN
Psychiatric Consult





- .


Consult date: 11/04/23


Consult:: 








History of Present Illness:


The patient is a 33-year-old male with a past medical history of depression, 

alcohol use disorder, and hypertension who presented to the ED with worsening 

depression and suicidal ideation in the context of heavy alcohol drinking. The 

psychiatric team was consulted to evaluate the patient due to reported suicidal 

ideation.


The patient was evaluated in his room on the medical floor. He reported that he 

came to the emergency department expecting to go to psychiatric treatment but 

was told that no beds were available in the psychiatric unit, and he may be 

transferred to another hospital.


The patient endorsed that he came to the ER because he was feeling increasingly 

depressed and having suicidal thoughts in the context of heavy drinking, 

consuming "almost 1/5 daily." He mentioned losing his job and his inability to 

stop drinking alcohol.


The patient reports that his depression started during his teenage years and may

be related to feeling neglected or ignored by his mother, as she was always 

isolated. He had previously tried antidepressants, including Celexa and Zoloft, 

but did not experience any benefits from them. He also attempted different types

of counseling, such as mental health therapy and substance use counseling, 

without any improvement in his depressive symptoms. The patient reported that 

his primary care physician prescribed Trazodone to help with insomnia. 


He described symptoms of depression, including a low mood, hopelessness, lack of

motivation, very low self-esteem, constant fatigue, and occasional suicidal 

ideation. The patient noted a worsening of his depression and suicidal ideation 

over the past few days due to heavy drinking, as he has been consuming 1/5 of 

liquor daily for the past few weeks. He reported feeling unsafe at home, 

especially since he lives with his grandparents, who are also heavy drinkers. 

The patient mentioned very poor sleep, difficulty focusing, and a decreased 

appetite.


The patient explained that he started heavy drinking about five years ago and 

escalated to excessive drinking over the past year, with an average of 1/5 

daily, occasionally binge-drinking non-stop for two weeks. He attributed the 

main trigger for his drinking to his depression, as he tries to numb his 

emotions.


The patient also reported severe anxiety with uncontrolled worry, inability to 

relax, and a constant feeling of unease. He denied current or previous symptoms 

of psychosis, hallucinations, paranoid ideation, or delusions.


The patient denied current or previous symptoms of flora, including periods of 

elevated mood, grandiosity, bursts of energy without the need for sleep, or 

impulsive/uninhibited behavior.


The patient mentioned that he had an outburst of anger at his workplace a few 

days ago, which resulted in his job loss.








Past Psychiatric History:


The patient had one previous inpatient psychiatric hospitalization for about 

three days a few years ago. He denies a history of a suicidal attempt but 

mentioned a recent worsening of suicidal thoughts over the past few months. The 

patient reported previous inpatient rehab and outpatient substance use treatment

about 7 to 10 times, with the last two occurring in the summer of this year.


He is currently taking psychiatric medications, including Trazodone, prescribed 

by his primary care physician, and has had previous trials of Zoloft and Celexa.





Medical History: hypertriglyceridemia, hypertension





Substance Use History:


The patient has a history of heavy alcohol drinking for the past five years and 

has a history of multiple inpatient rehab stays and substance use counseling. He

occasionally smokes marijuana and smokes one pack of cigarettes daily for about 

10 years.





Family Psychiatric History:


Both grandparents are heavy drinkers, with his grandfather consuming about three

to five shots daily and his grandmother drinking one bottle of wine daily. 


His father suffered from alcoholism, as well as maternal uncles. 


He is unsure if there is any diagnosed mental illness in his family. 


He mentioned that his maternal aunt overdosed, but he is unsure if it was a 

suicide attempt.





Social/Developmental History:


The patient currently lives with his grandparents. 


He used to work at [employer] but lost his job recently. 


He completed high school and some college but has never  and has no 

children. He was raised by both his parents and grandparents. 


He reports experiencing psychological trauma from incidents during  

when he was yelled at by his teacher. He was also traumatized by watching his 

father almost die during a camping trip.








Mental Status Examination:  


Appearance: Appears stated age, disheveled, average body built, and no specific 

features.   


Gait/ posture: Steady gait, normal arm swinging, no abnormal movements, with 

relaxed posture.   


Attitude and Behavior: Engaged, cooperative, maintained eye contact during 

course of interview.    


Motor Activity: Normal psychomotor activity.    


Speech: Normal rate, rhythm, articulation, and prosody. None pressured.   


Mood: " depressed   


Affect: Constricted, appropriate to context and situation.   


Thought form: Goal directed, linear, and relevant, not incoherent and no clang 

association.    


Thought content: Logical, non-delusional, reports suicidal thoughts, Denies 

homicidal thoughts, intentions, or plans.   


Perception: Denies any auditory/ visual or tactile hallucinations.   


Attention: No impairment.    


Orientation: Oriented to time, place, person, and situation.   


Insight & Judgment: Limited    


Impulse control: Limited   





Assessment and Diagnosis: 


Major depressive disorder, severe without psychotic features 


Alcohol use disorder, severe 


Anxiety disorder, unspecified. 





Recommendation: 


Disposition/Follow-up: Is psychiatric hospitalization indicated? Yes 


Addressed and ensured patient's safety; patient does meet the criteria for 

psychiatric hospitalization. The patient is  actively suicidal and has active 

thoughts to hurt himself and not feeling safe going back home.   


Please transfer the patient to inpatient psychiatric level of care after 

achieving medical stabilization. 


Place the patient on 1:1 observation for safety. 


Currently, there is no need for further follow-up by the psychiatric team. 





Medication management: 


Effexor XR 37.5 mg PO QD for depression and anxiety symptoms. 


Trazodone 50 mg PO HS for insomnia and depression 


Ativan 1 mg PO TID PRN for anxiety and ETOH withdrawal symptoms 





Brief supportive psychotherapy and psychoeducation were provided to the patient.

 


Discussed the treatment plan with the requesting physician/service. 


Thank you for permitting me to assist in this patient's treatment. Please call 

the psychiatry department if you have any questions or need further help with 

this case. 





 


11/04/23 23:25

## 2023-11-05 NOTE — P.PN
Subjective





Patient is a 33-year-old pleasant male came in for alcohol withdrawals.  Patient

to stop drinking more than 2 days ago has been undergoing withdrawals.  Although

today he only received the 1 mg 1 time dose of Ativan so far since 

hospitalization.  Patient withdraws significant improved.  Patient is also 

depressed always has thoughts of doesn't want to live but doesn't have a plan 

for sweets side never attempted suicide.


Patient does have history of hypertension and is on atenolol at home but his 

blood pressure is low normal patient is bit tachycardic at this time patient is 

receiving IV fluids denied any abdominal pain nausea vomiting patient drinks 

usually one fifth of alcohol on daily basis attempted to quit many times was 

unable to quit by himself.  He does smoke marijuana occasionally








11/05/2023


Patient doesn't have any withdrawals anymore and not requiring Ativan patient is

feeling well psychiatry evaluated the patient is recommending transfer to 

inpatient psychiatric floor in waiting for bed.





Constitutional: Denied any fatigue denied any fever.


Cardio vascular: denied any chest pain, palpitations


Gastrointestinal denied any nausea vomiting


Pulmonary: Denied any shortness of breath cough


Neurologic denied any new focal deficits





All inpatient medications were reviewed and appropriate changes in these 

medications as dictated in the interval history and assessment and plan.

















PHYSICAL EXAMINATION: 





GENERAL: The patient is alert and oriented x3, not in any acute distress. Well 

developed, well nourished. 


HEENT: Pupils are round and equally reacting to light. EOMI. No scleral icterus.

No conjunctival pallor. Normocephalic, atraumatic. No pharyngeal erythema. No 

thyromegaly. 


CARDIOVASCULAR: S1 and S2 present. No murmurs, rubs, or gallops. 


PULMONARY: Chest is clear to auscultation, no wheezing or crackles. 


ABDOMEN: Soft, nontender, nondistended, normoactive bowel sounds. No palpable 

organomegaly. 


MUSCULOSKELETAL: No joint swelling or deformity.


EXTREMITIES: No cyanosis, clubbing, or pedal edema. 


NEUROLOGICAL: Gross neurological examination did not reveal any focal deficits. 


SKIN: No rashes. 





Assessment and plan


-Alcohol withdrawals which improved patient not requiring any Ativan or Librium 

patient is medically stable to be discharged to psychiatric floor


-Alcohol abuse: Counseling was provided and the  will be consulted 

to provide him with the resources for cessation of alcohol


-Tachycardia improved with metoprolol


-Hypertension blood pressure well controlled at this time


-Hyperlipidemia


-Obesity


Depression and suicidal ideation: Psychiatry valid the patient recommending 

inpatient psychiatric floor


-Acute alcoholic hepatitis expected to improve with cessation of alcohol


-Hypervolemic hyponatremia improved with IV fluids





Patient will be discharged today if cleared by psychiatry and after evaluation 

with social work








Objective





- Vital Signs


Vital signs: 


                                   Vital Signs











Temp  97.6 F   11/05/23 07:00


 


Pulse  101 H  11/05/23 07:00


 


Resp  16   11/05/23 07:00


 


BP  137/97   11/05/23 07:00


 


Pulse Ox  97   11/05/23 07:00


 


FiO2      








                                 Intake & Output











 11/04/23 11/05/23 11/05/23





 19:59 06:59 18:59


 


Intake Total   240


 


Balance   240


 


Intake:   


 


  Oral   240


 


Other:   


 


  # Voids   














- Labs


CBC & Chem 7: 


                                 11/04/23 04:41





                                 11/04/23 04:41

## 2023-11-06 NOTE — P.DS
Providers


Date of admission: 


11/03/23 20:37





Expected date of discharge: 11/06/23


Attending physician: 


Carlos Gallego





Consults: 





                                        





11/03/23 20:37


Consult Physician Routine 


   Consulting Provider: Noe Markham


   Consult Reason/Comments: suicidal


   Do you want consulting provider notified?: Yes











Primary care physician: 


Shaggy Thakkar





Hospital Course: 











Final diagnosis





-Alcohol withdrawals, improved


-Alcohol abuse: Counseling was provided 


-Tachycardia,  reflex tachycardia 


-Hypertension, presently hypotensive


-Hyperlipidemia


-Morbid Obesity with a BMI of 40.2


-Depression with suicidal ideation


-Acute alcoholic hepatitis, improving


-Hypovolemic hyponatremia, improved


-GI prophylaxis


-DVT prophylaxis


-Full code











Discharge disposition


Patient is being transferred in a stable condition with guarded prognosis to [facility]

inpatient psychiatric unit, voluntarily .  Patient will follow-up with Dr. Thakkar  in the outpatient setting upon discharge.  Patient is to follow with 

[facility] outpatient.  Total time taken is greater than 35 minutes.





Hospital course


This is a 33-year-old male who was recently admitted with alcohol abuse with 

acute alcohol withdrawals maintained on CIWA protocol.  Patient seen and 

evaluated by psychiatry stating he is meeting criteria for inpatient admission 

and patient is agreeable.  Patient to sign voluntarily to continue psychiatric 

treatment and evaluation and medication adjustments on 3 [facility].  Awaiting EPS nursed

for evaluation.  Patient reports having thoughts of suicidal ideation with no 

plan and no intent to harm and reports feeling overly depressed and using 

alcohol in attempt to get through his problems and does not want to continue.  

Patient this morning on exam was agreeable to inpatient psychiatric unit for 

further psychiatric care.  Patient is medically stable and awaiting a bed on 3 

W. Currently no reports of chest pain, shortness of breath, or palpitations.  

Patient is afebrile.  No reports of nausea or vomiting and patient is tolerating

diet.  Patient will be going to [facility]. if a bed is available today.





Physical exam:








Gen: This is a 33-year-old male who is awake, alert and oriented 3, well-

developed, well-nourished, morbidly obese


HEENT: Head is atraumatic, normocephalic. Pupils equal, round. Sclerae is 

anicteric. 


NECK: Supple. No JVD. No lymphadenopathy. No thyromegaly. 


LUNGS: Clear to auscultation. No wheezes or rhonchi.  No intercostal 

retractions.


HEART: Regular rate and rhythm. No murmur. 


ABDOMEN: Soft.  Obese.  Bowel sounds are present. No masses.  No tenderness.


EXTREMITIES: No pedal edema.  No calf tenderness.


NEUROLOGICAL: Patient is awake, alert and oriented x3. Cranial nerves 2 through 

12 are grossly intact. 





Please refer to medication reconciliation sheet for a list of medications.





The impression and plan of care has been dictated by Juanita Blakely, Nurse 

Practitioner as directed.





Dr. Hanane MD


I have performed a history and examination and MDM of this patient, discussed 

the same with the dictator, and  agree with the dictator's assessment and plan 

as written ,documented as a scribe. Based on total visit time,  I have performed

more than 50% of the visit.  


Patient Condition at Discharge: Fair





Plan - Discharge Summary


New Discharge Prescriptions: 


New


   LORazepam [Ativan] 1 mg PO Q12HR PRN  tab


     PRN Reason: Anxiety


   Venlafaxine HCl ER [Effexor XR] 37.5 mg PO DAILY  cap


   Famotidine [Pepcid] 20 mg PO BID  tab


   Metoprolol Succinate (ER) [Toprol Xl] 25 mg PO DAILY #30 tab


   Folic Acid 1 mg PO DAILY  tab


   Nicotine 21Mg/24Hr Patch [Habitrol] 1 patch TRANSDERM DAILY  patch


   Nicotine Gum (Polacrilex) [Nicorette] 2 mg BUCCAL Q2HR PRN  pieceofgum


     PRN Reason: Nicotine Cravings





Continue


   Multivitamins, Thera [Multivitamin (formulary)] 1 tab PO DAILY


   Fenofibrate,Micronized [Fenofibrate] 134 mg PO DAILY


   Vitamin B Complex 1 cap PO DAILY


   Mag Hydrox/Aluminum Hyd/Simeth [Mylanta Maximum Strength Liq] 10 - 20 ml PO 

ACHS PRN


     PRN Reason: Acid indigestion


   traZODone HCL [Desyrel] 50 mg PO HS PRN


     PRN Reason: Insomnia





Discontinued


   atenoloL 25 mg PO DAILY


Discharge Medication List





Fenofibrate,Micronized [Fenofibrate] 134 mg PO DAILY 11/03/23 [History]


Mag Hydrox/Aluminum Hyd/Simeth [Mylanta Maximum Strength Liq] 10 - 20 ml PO ACHS

PRN 11/03/23 [History]


Multivitamins, Thera [Multivitamin (formulary)] 1 tab PO DAILY 11/03/23 

[History]


Vitamin B Complex 1 cap PO DAILY 11/03/23 [History]


Metoprolol Succinate (ER) [Toprol Xl] 25 mg PO DAILY #30 tab 11/04/23 [Rx]


traZODone HCL [Desyrel] 50 mg PO HS PRN 11/04/23 [History]


Famotidine [Pepcid] 20 mg PO BID  tab 11/06/23 [Rx]


Folic Acid 1 mg PO DAILY  tab 11/06/23 [Rx]


LORazepam [Ativan] 1 mg PO Q12HR PRN  tab 11/06/23 [Rx]


Nicotine 21Mg/24Hr Patch [Habitrol] 1 patch TRANSDERM DAILY  patch 11/06/23 [Rx]


Nicotine Gum (Polacrilex) [Nicorette] 2 mg BUCCAL Q2HR PRN  pieceofgum 11/06/23 

[Rx]


Venlafaxine HCl ER [Effexor XR] 37.5 mg PO DAILY  cap 11/06/23 [Rx]








Follow up Appointment(s)/Referral(s): 


Shaggy Thakkar MD [Primary Care Provider] - 3 Days


Activity/Diet/Wound Care/Special Instructions: 


Patient is medically stable for transfer to psych unit for further evaluation 

and will be signing voluntarily


Discharge Disposition: TRANSFER TO PSYCH HOSP/UNIT

## 2023-11-07 NOTE — P.CONS
History of Present Illness





- Reason for Consult


Consult date: 11/07/23


Medical management


Requesting physician: Duke Rivas





- Chief Complaint


Depression





- History of Present Illness





This 33-year-old patient was initially presented to the hospital on November 3. 

To the ER.  Feeling suicidal.  He was withdrawing from alcohol.  She has stopped

drinking 2 days prior to that.  Was put on the CIWA scale.  Patient recently had

a meltdown at his job and also was laid off.  He normally was drinking one fifth

of alcohol on a daily basis.  Has attempted to stop alcohol many times.  

Subsequently was discharged on November 6 to the 3 W. psychiatry unit after 

being seen by psychiatrist.


Patient's depression is a shade better.  Eating well.  Had a bowel movement.  

Currently no withdrawal symptoms.  He slept better.





Review of systems:


GEN.: None


EYES: None


HEENT: None


NECK: None


RESPIRATORY: None


CARDIOVASCULAR: None


GASTROINTESTINAL: Reflux


GENITOURINARY: None


MUSCULOSKELETAL: None


LYMPHATICS: None


HEMATOLOGICAL: None  


PSYCHIATRY: Depressed


NEUROLOGICAL: None





Past medical history to include:


Essential hypertension, hyperlipidemia, GERD, alcohol use disorder, anxiety 

depression.





Social history:


Was living with his grandparents.  Laid off from his job because of alcohol.  

Was drinking a fifth or more alcohol.  Smoker.  Also did marijuana.





Physical examination:


VITAL SIGNS: 97.4, 102, 16, 140/90, 95% room air


GENERAL: BMI 39.7, sitting up awake but a distress.


EYES: Pupils equal.  Conjunctiva normal.


HEENT: External appearance of nose and ears normal, oral cavity grossly normal.


NECK: JVD not raised; masses not palpable.


HEART: First and second heart sounds are normal;  no edema.  


LUNGS: Respiratory rate normal; clear to auscultation.  


ABDOMEN: Soft,  nontender, liver spleen not palpable, no masses palpable.  


PSYCH: Alert and oriented x3;  mood  and affect some anxietyl.  


MUSCULOSKELETAL:No Clubbing/cyanosis;muscles-grossly intact


NEUROLOGICAL: Cranial nerves grossly intact; no facial asymmetry,   power and 

sensation grossly intact. 


LYMPHATICS: No lymph nodes palpable in the axilla and neck





INVESTIGATIONS, reviewed in the clinical context:


November 4:


White count 5.7 hemoglobin 15.7 platelets 182 sodium 135 potassium 4.4 BUN 14 

creatinine 1.05  


Urine drug screen positive for marijuana


COVID-19 PCR: Not detected





Assessment and plan:





-Alcohol use disorder


Thiamine 100 mg a day.





-Chronic Coumadin dependence, cigarette smoker


Nicotine patch 21





-Recreational marijuana use





-Obesity BMI 39.7


Consult dietitian for weight loss measures





-Alcoholic hepatitis


Liver ultrasound 


follow by Dr. MIKY Garcia outpatient





-GERD


Pepcid





-Major depressive disorder, severe without psychotic features.  Anxiety disorder

unspecified


Being followed by psychiatry


Lithium, Ativan when necessary, trazodone, Effexor XR





Care was discussed with the patient.  Questions answered.





Thank you Dr. Rivas








Past Medical History


Past Medical History: Hyperlipidemia, Hypertension


Additional Past Medical History / Comment(s): Etoh abuse


History of Any Multi-Drug Resistant Organisms: None Reported


Past Surgical History: No Surgical Hx Reported


Past Psychological History: Anxiety, Depression


Smoking Status: Current every day smoker


Past Alcohol Use History: Abuse, Daily, Heavy


Additional Past Alcohol Use History / Comment(s): fith to fith and half


Past Drug Use History: Marijuana





Medications and Allergies


                                Home Medications











 Medication  Instructions  Recorded  Confirmed  Type


 


Fenofibrate,Micronized 134 mg PO DAILY 11/03/23 11/06/23 History





[Fenofibrate]    


 


Mag Hydrox/Aluminum Hyd/Simeth 10 - 20 ml PO ACHS PRN 11/03/23 11/06/23 History





[Mylanta Maximum Strength Liq]    


 


Multivitamins, Thera [Multivitamin 1 tab PO DAILY 11/03/23 11/06/23 History





(formulary)]    


 


Vitamin B Complex 1 cap PO DAILY 11/03/23 11/06/23 History


 


Metoprolol Succinate (ER) [Toprol 25 mg PO DAILY #30 tab 11/04/23 11/06/23 Rx





Xl]    


 


traZODone HCL [Desyrel] 50 mg PO HS PRN 11/04/23 11/06/23 History


 


Famotidine [Pepcid] 20 mg PO BID  tab 11/06/23 11/06/23 Rx


 


Folic Acid 1 mg PO DAILY  tab 11/06/23 11/06/23 Rx


 


LORazepam [Ativan] 1 mg PO Q12HR PRN  tab 11/06/23 11/06/23 Rx


 


Nicotine 21Mg/24Hr Patch [Habitrol] 1 patch TRANSDERM DAILY  patch 11/06/23 11/06/23 Rx


 


Nicotine Gum (Polacrilex) 2 mg BUCCAL Q2HR PRN  pieceofgum 11/06/23 11/06/23 Rx





[Nicorette]    


 


Venlafaxine HCl ER [Effexor XR] 37.5 mg PO DAILY  cap 11/06/23 11/06/23 Rx








                                    Allergies











Allergy/AdvReac Type Severity Reaction Status Date / Time


 


Penicillins Allergy  Rash/Hives Verified 11/03/23 18:19














Physical Exam


Vitals: 


                                   Vital Signs











  Temp Pulse Resp BP Pulse Ox


 


 11/07/23 09:22   102 H   144/90 


 


 11/07/23 08:29  97.4 F L  111 H  16  155/105  95


 


 11/06/23 16:31  96.8 F L  113 H  20  147/91  97








                                Intake and Output











 11/06/23 11/07/23 11/07/23





 22:59 06:59 14:59


 


Other:   


 


  Weight 125.418 kg

## 2023-11-07 NOTE — P.HP
Psychiatric H&P





- .


H&P Date: 11/07/23


History & Physical: 


                                    Allergies











Allergy/AdvReac Type Severity Reaction Status Date / Time


 


Penicillins Allergy  Rash/Hives Verified 11/03/23 18:19








                                   Vital Signs











Temp  97.4 F L  11/07/23 08:29


 


Pulse  102 H  11/07/23 09:22


 


Resp  16   11/07/23 08:29


 


BP  144/90   11/07/23 09:22


 


Pulse Ox  95   11/07/23 08:29


 


FiO2      








                                 Intake & Output











 11/06/23 11/07/23 11/07/23





 18:59 06:59 18:59


 


Weight 125.418 kg  











11/07/23 15:06


Identifying Data: Patient is a 32 yo  male, currently lives with his 

grandparents in a house, unemployed, single, no kids.





History of Present Illness:


According to consultation note documented by Dr Wilder "The patient is a 

33-year-old male with a past medical history of depression, alcohol use 

disorder, and hypertension who presented to the ED with worsening depression and

suicidal ideation in the context of heavy alcohol drinking. The psychiatric team

was consulted to evaluate the patient due to reported suicidal ideation.


The patient was evaluated in his room on the medical floor. He reported that he 

came to the emergency department expecting to go to psychiatric treatment but 

was told that no beds were available in the psychiatric unit, and he may be 

transferred to another hospital. The patient endorsed that he came to the ER 

because he was feeling increasingly depressed and having suicidal thoughts in 

the context of heavy drinking, consuming "almost 1/5 daily." He mentioned losing

his job and his inability to stop drinking alcohol. The patient reports that his

depression started during his teenage years and may be related to feeling 

neglected or ignored by his mother, as she was always isolated. He had 

previously tried antidepressants, including Celexa and Zoloft, but did not 

experience any benefits from them. He also attempted different types of 

counseling, such as mental health therapy and substance use counseling, without 

any improvement in his depressive symptoms. The patient reported that his 

primary care physician prescribed Trazodone to help with insomnia. 


He described symptoms of depression, including a low mood, hopelessness, lack of

motivation, very low self-esteem, constant fatigue, and occasional suicidal 

ideation. The patient noted a worsening of his depression and suicidal ideation 

over the past few days due to heavy drinking, as he has been consuming 1/5 of 

liquor daily for the past few weeks. He reported feeling unsafe at home, 

especially since he lives with his grandparents, who are also heavy drinkers. 

The patient mentioned very poor sleep, difficulty focusing, and a decreased 

appetite. The patient explained that he started heavy drinking about five years 

ago and escalated to excessive drinking over the past year, with an average of 

1/5 daily, occasionally binge-drinking non-stop for two weeks. He attributed the

main trigger for his drinking to his depression, as he tries to numb his 

emotions.


The patient also reported severe anxiety with uncontrolled worry, inability to 

relax, and a constant feeling of unease. He denied current or previous symptoms 

of psychosis, hallucinations, paranoid ideation, or delusions.


The patient denied current or previous symptoms of flora, including periods of 

elevated mood, grandiosity, bursts of energy without the need for sleep, or 

impulsive/uninhibited behavior.


The patient mentioned that he had an outburst of anger at his workplace a few 

days ago, which resulted in his job loss." patient was seen today for psych 

assessment and admission today by writer. he corroborated the information given 

previously, spoke about the increased etoh and the issues at work where he was 

impulsive, aggressive. he also spoke about depression, anxiety. he claims that 

he was feeling suicidal before coming in the hospital however claims that this 

has improved.  Claimed that he was having increasing work stress, more work 

load, spoke about binge drinking alcohol for the past 2-3 years.  States that he

was drinking about a fifth of vodka a day, he claims that he withdrew from the 

alcohol on his own for 5 or 6 days at home before coming to the hospital.  

States that he does not have a history of DTs or withdrawal seizures.  He states

that he is sleeping about 6-8 hours of sleep with trazodone on board, claims 

that his appetite is fair.  States that his mood has been improving since being 

on the mental health unit.  At this time he is denying any current suicidal or 

homicidal ideations intent or plan.  Denied any auditory or visual 

hallucinations.





The rest of the social history was taken from and corroborated with patient on 

DR Ochoa note.





Past Psychiatric History:


The patient had one previous inpatient psychiatric hospitalization for about 

three days a few years ago. He denies a history of a suicidal attempt but 

mentioned a recent worsening of suicidal thoughts over the past few months. The 

patient reported previous inpatient rehab and outpatient substance use treatment

about 7 to 10 times, with the last two occurring in the summer of this year.


He is currently taking psychiatric medications, including Trazodone and Effexor,

prescribed by his primary care physician, and has had previous trials of Zoloft 

and Celexa.





Medical History: hypertriglyceridemia, hypertension





Substance Use History:


The patient has a history of heavy alcohol drinking for the past five years and 

has a history of multiple inpatient rehab stays and substance use counseling. He

occasionally smokes marijuana and smokes one pack of cigarettes daily for about 

10 years.





Family Psychiatric History:


Both grandparents are heavy drinkers, with his grandfather consuming about three

to five shots daily and his grandmother drinking one bottle of wine daily. 


His father suffered from alcoholism, as well as maternal uncles. 


He is unsure if there is any diagnosed mental illness in his family. 


He mentioned that his maternal aunt overdosed, but he is unsure if it was a 

suicide attempt.





Social/Developmental History:


The patient currently lives with his grandparents. 


He used to work at [employer] but lost his job recently. 


He completed high school and some college but has never  and has no 

children. He was raised by both his parents and grandparents. 


He reports experiencing psychological trauma from incidents during  

when he was yelled at by his teacher. He was also traumatized by watching his 

father almost die during a camping trip.








Mental Status Examination:  


Appearance: Appears stated age, improving hygiene and grooming, average body 

built, and no specific features.   


Attitude and Behavior: Engaged, cooperative, maintained eye contact during 

course of interview.    


Motor Activity: Normal psychomotor activity.    


Speech: Normal rate, rhythm, articulation, and prosody. None pressured.   


Mood: " depressed but better  


Affect: Constricted, appropriate to context and situation.   


Thought form: Goal directed, linear, and relevant, not incoherent and no clang 

association.    


Thought content: Logical, non-delusional, reports suicidal thoughts, Denies 

homicidal thoughts, intentions, or plans.   


Perception: Denies any auditory/ visual or tactile hallucinations.   


Attention: No impairment.    


Orientation: Oriented to time, place, person, and situation.   


Insight & Judgment: Improving  


Impulse control: Improving





Assessment: 


Major depressive disorder, severe without psychotic features 


Alcohol use disorder, severe 


Anxiety disorder, unspecified


Cannabis use disorder


Nicotine dependence





STRENGTHS/WEAKNESSES: strength is that patient is resilient. Weakness is that 

patient has poor judgment and is impulsive





INTELLECT: average





PLAN: 


-Patient is admitted under voluntary status to MHU for stabilization of 

psychiatric symptoms and safety. Patient has signed adult voluntary form and 

medication consent and is placed in patient's chart. 


-Medications : Increase Effexor XR 75 mg daily for mood/anxiety.  Trazodone 50 

mg daily at bedtime for insomnia/mood.  We'll speak with patient about starting 

naltrexone for anti-craving.  Added lithium 300 mg daily for mood adjunct


-Ativan and Haldol PRN for agitation/aggression


-CIWA discontinued due to patient being off alcohol for about a week's time now.


-Patient was counselled on substance abuse and desired to cut back on use, we'll

speak with patient tomorrow about substance use rehab.


-Patient was informed of the risks, benefits and side effects of the medication 

and patient verbally consented to taking the medications. Patient signed med 

consent form and was placed in chart.


-Internal Medicine consult to perform medical evaluation and physical.


-NRT - nicotine patch


-SW on board for discharge planning. Encourage patient to participate in groups 

to work on coping skills.

## 2023-11-08 NOTE — US
EXAMINATION TYPE: US abdomen limited

 

DATE OF EXAM: 11/8/2023

 

COMPARISON: NONE

 

CLINICAL INDICATION: Male, 33 years old with history of Alcohol,history.  Elevated liver enzymes; meggan
vated liver enzymes

 

TECHNIQUE: Multiple sonographic images of the right upper quadrant are obtained.

 

FINDINGS:

 

EXAM MEASUREMENTS:

 

Liver Length:  18.2 cm   

Gallbladder Wall:  .3 cm   

CBD:  .4 cm

Right Kidney:  12.6 x 5.2 x 5.2  cm

 

SONOGRAPHER NOTES:

 

Pancreas:  Obscured by bowel gas

Liver:  Increased attenuation  

Gallbladder:  No stones seen

**Evidence for sonographic Mathew's sign:  No

CBD:  wnl 

Right Kidney:  No hydronephrosis or masses seen 

 

 

 

IMPRESSION: 

1. Nonspecific pattern of the liver can be seen with hepatic steatosis or hepatocellular disease incl
uding hepatitis correlate clinically.

2. Mild hepatomegaly

## 2023-11-08 NOTE — P.PN
Progress Note - Text


Progress Note Date: 11/08/23





- Chief Complaint


Depression





- History of Present Illness





This 33-year-old patient was initially presented to the hospital on November 3. 

To the ER.  Feeling suicidal.  He was withdrawing from alcohol.  She has stopped

drinking 2 days prior to that.  Was put on the CIWA scale.  Patient recently had

a meltdown at his job and also was laid off.  He normally was drinking one fifth

of alcohol on a daily basis.  Has attempted to stop alcohol many times.  

Subsequently was discharged on November 6 to the 3 W psychiatry unit after 

being seen by psychiatrist.


Patient's depression is a shade better.  Eating well.  Had a bowel movement.  

Currently no withdrawal symptoms.  He slept better.


November 8: Ambulate in the hallway.  Feels a bit better.  Did tolerate his 

diet.  Abdominal ultrasound suggestive of possibly hepatic steatosis.  Labs the 

patient follow up with Dr. MIKY Garcia outpatient.  Discussed with patient about 

alcohol cessation again.  Fenofibrate was discontinued because of elevated liver

enzymes.  Because of his current medication should have his liver function 

checked every month.





Active Medications





Acetaminophen (Acetaminophen Tab 325 Mg Tab)  650 mg PO Q4HR PRN


   PRN Reason: Mild Pain (Scale 1 to 3)


   Last Admin: 11/07/23 09:21 Dose:  650 mg


   


Al Hydroxide/Mg Hydroxide (Mag Hydrox/Al Hydrox/Simeth 30 Ml Cup)  30 ml PO Q4HR

PRN


   PRN Reason: GI Upset


Famotidine (Famotidine 20 Mg Tab)  20 mg PO BID [facility]


   Last Admin: 11/08/23 08:39 Dose:  20 mg


   


Folic Acid (Folic Acid 1 Mg Tab)  1 mg PO DAILY [facility]


   Last Admin: 11/08/23 08:39 Dose:  1 mg


   


Ibuprofen (Ibuprofen 600 Mg Tab)  600 mg PO Q6HR PRN


   PRN Reason: Moderate Pain (Scale 4 to 6)


Lithium Carbonate (Lithium Carbonate 300 Mg Cap)  300 mg PO DAILY [facility]


   Last Admin: 11/08/23 08:39 Dose:  300 mg


   


Lorazepam (Lorazepam 1 Mg Tab)  1 mg PO Q6HR PRN


   PRN Reason: Agitation or Acute Anxiety


Lorazepam (Lorazepam 2 Mg/Ml Inj)  1 mg IM Q6HR PRN


   PRN Reason: Agitation or Acute Anxiety


Magnesium Hydroxide (Magnesium Hydroxide 2,400 Mg/30 Ml Cup)  2,400 mg PO DAILY 

PRN


   PRN Reason: Constipation


Metoprolol Tartrate (Metoprolol Tartrate 25 Mg Tab)  25 mg PO BID [facility]


   Last Admin: 11/08/23 08:39 Dose:  25 mg


   


Multivitamins (Multivitamins, Thera 1 Each Tab)  1 each PO DAILY [facility]


   Last Admin: 11/08/23 08:39 Dose:  1 each


   


Nicotine (Nicotine 21mg/24hr Patch)  1 patch TRANSDERM DAILY [facility]


   Last Admin: 11/08/23 08:38 Dose:  1 patch


   


Nicotine Polacrilex (Nicotine Gum (Polacrilex) 2 Mg Gum)  2 mg BUCCAL Q2HR PRN


   PRN Reason: Nicotine Cravings


Trazodone HCl (Trazodone Hcl 100 Mg Tab)  100 mg PO HS DESIRE


Venlafaxine HCl (Venlafaxine Hcl Er 75 Mg Cap)  75 mg PO DAILY [facility]


   Last Admin: 11/08/23 08:40 Dose:  75 mg


   

















Past medical history to include:


Essential hypertension, hyperlipidemia, GERD, alcohol use disorder, anxiety 

depression.





Social history:


Was living with his grandparents.  Laid off from his job because of alcohol.  

Was drinking a fifth or more alcohol.  Smoker.  Also did marijuana.





Physical examination:


VITAL SIGNS: 98, 106, 16, 120/70, 95% room air


GENERAL: Up in chair, comfortable


EYES: Pupils equal.  Conjunctiva normal.


HEENT: External appearance of nose and ears normal, oral cavity grossly normal.


NECK: JVD not raised; masses not palpable.


HEART: First and second heart sounds are normal;  no edema.  


LUNGS: Respiratory rate normal; clear to auscultation.  


ABDOMEN: Soft,  nontender, liver spleen not palpable, no masses palpable.  


PSYCH: Alert and oriented x3;  mood  and affect some anxietyl.  


MUSCULOSKELETAL:No Clubbing/cyanosis;muscles-grossly intact





INVESTIGATIONS, reviewed in the clinical context:


November 8: Potassium 4.5  


November 4:White count 5.7 hemoglobin 15.7 platelets 182 sodium 135 potassium 

4.4 BUN 14 creatinine 1.05  


Urine drug screen positive for marijuana


COVID-19 PCR: Not detected





Assessment and plan:





-Alcohol use disorder


Thiamine 100 mg a day.





-Chronic nicotine dependence, cigarette smoker


Nicotine patch 21





-Recreational marijuana use





-Obesity BMI 39.7


Dietitian consulted for weight loss measures





-Alcoholic hepatitis


Liver ultrasound noted


follow by Dr. MIKY Garcia outpatient





-GERD


Pepcid





-Major depressive disorder, severe without psychotic features.  Anxiety disorder

unspecified


Being followed by psychiatry


Lithium, Ativan when necessary, trazodone, Effexor XR





-Fenofibrate discontinued because of elevated liver enzymes





Patient should have monthly liver enzymes checked.  Follow-up with Dr. MIKY Garcia 

outpatient.  Follow liver enzymes 





Thank you Dr. Rivas








Past Medical History


Past Medical History: Hyperlipidemia, Hypertension


Additional Past Medical History / Comment(s): Etoh abuse


History of Any Multi-Drug Resistant Organisms: None Reported


Past Surgical History: No Surgical Hx Reported


Past Psychological History: Anxiety, Depression


Smoking Status: Current every day smoker


Past Alcohol Use History: Abuse, Daily, Heavy


Additional Past Alcohol Use History / Comment(s): fith to fith and half


Past Drug Use History: Marijuana

## 2023-11-08 NOTE — P.PN
Progress Note - Text


Progress Note Date: 11/08/23





Interval history:


Patient was seen today for psychiatric follow up. Patient states that he feels 

he is doing a bit better today with regards to his depression and anxiety. He 

states that he was able to sleep about 4 or 5 hours last night, he was 

requesting increase in his trazodone.  We reviewed the results of his ultrasound

which was done yesterday and the importance of alcohol cessation, patient was 

agreeable to this however somewhat superficial.  We also spoke about rehab and 

anti-craving medications and patient states that he is tried both in the past 

and has failed.  He claims that "I can work on my sobriety myself".  He claims 

that he is going to some groups and trying to participate as best as he can.  He

was fairly focused on discharge.  At this time is denying any auditory or visual

hallucination.  Denying suicidal or homicidal ideations intent or plan.  She has

been taking his medications not reporting any side effects today.





mental Status examination:


Appearance: Appears stated age, improving hygiene and grooming, average body 

built, and no specific features.   


Attitude and Behavior: Engaged, cooperative, maintained eye contact during 

course of interview.    


Motor Activity: Normal psychomotor activity.    


Speech: Normal rate, rhythm, articulation, and prosody. None pressured.   


Mood: " depressed but better improving


Affect: improving mildly, appropriate to context and situation.   


Thought form: Logical, non-delusional, goal oriented. minimizing need for 

hospitalization.


Perception: Denies any auditory/ visual or tactile hallucinations.   


Orientation: Oriented to time, place, person, and situation.   


Insight & Judgment: Improving  


Impulse control: Improving





Assessment: 


Major depressive disorder, severe without psychotic features 


Alcohol use disorder, severe 


Anxiety disorder, unspecified


Cannabis use disorder


Nicotine dependence





PLAN: 


-Patient is admitted under voluntary status to MHU for stabilization of 

psychiatric symptoms and safety. Patient has signed adult voluntary form and 

medication consent and is placed in patient's chart. 


-Medications : continue Effexor XR 75 mg daily for mood/anxiety. increase 

Trazodone 100 mg daily at bedtime for insomnia/mood. continue lithium 300 mg 

daily for mood adjunct


-Ativan and Haldol PRN for agitation/aggression


-abdominal U/S completed on 11/7 - showed mild hepatomegaly, nonspecific pattern

possibly hepatic steatosis or hepatocellular disease.


-NRT - nicotine patch


-SW on board for discharge planning. Encourage patient to participate in groups 

to work on coping skills. patient is refusing rehab at this time. Patient is 

also declining anti cravings medications. likely discharge in 1-2 days back 

home.

## 2023-11-09 NOTE — P.PN
Progress Note - Text


Progress Note Date: 11/09/23





Interval history:


Patient was seen today for psychiatric follow up.  Patient was laying in his b

ed.  Patient states that he feels he is doing a bit better today, claims that he

did have a bit of a dry mouth from the trazodone however states that he was able

to sleep much better.  He claims that he is feeling more positive today, states 

that his mood and anxiety.  Improving.  He claims that he would like to follow-

up at [facility] as an outpatient to work on his alcohol use and wants to get back to 

work.  He claims that his appetite is fair at this time. At this time is denying

any auditory or visual hallucination.  Denying suicidal or homicidal ideations 

intent or plan.  She has been taking his medications not reporting any side 

effects today.





mental Status examination:


Appearance: Appears stated age, improving hygiene and grooming, average body 

built, and no specific features.   


Attitude and Behavior: Engaged, cooperative, maintained eye contact during 

course of interview.    


Motor Activity: Normal psychomotor activity.    


Speech: Normal rate, rhythm, articulation, and prosody. None pressured.   


Mood: " depressed but better improving


Affect: improving mildly, appropriate to context and situation.   


Thought form: Logical, non-delusional, goal oriented. minimizing need for hos

pitalization.


Perception: Denies any auditory/ visual or tactile hallucinations.   


Orientation: Oriented to time, place, person, and situation.   


Insight & Judgment: Improving  


Impulse control: Improving





Assessment: 


Major depressive disorder, severe without psychotic features 


Alcohol use disorder, severe 


Anxiety disorder, unspecified


Cannabis use disorder


Nicotine dependence





PLAN: 


-Patient is admitted under voluntary status to [facility]HU for stabilization of 

psychiatric symptoms and safety. Patient has signed adult voluntary form and 

medication consent and is placed in patient's chart. 


-Medications : continue Effexor XR 75 mg daily for mood/anxiety.  Continue 

Trazodone 100 mg daily at bedtime for insomnia/mood. continue lithium 300 mg 

daily for mood adjunct


-Ativan and Haldol PRN for agitation/aggression


-abdominal U/S completed on 11/7 - showed mild hepatomegaly, nonspecific pattern

possibly hepatic steatosis or hepatocellular disease.


-NRT - nicotine patch


-SW on board for discharge planning. Encourage patient to participate in groups 

to work on coping skills. patient is refusing rehab at this time. Patient is 

also declining anti cravings medications. likely discharge back home tomorrow if

patient is doing well.

## 2023-11-10 NOTE — P.DS
Providers


Date of admission: 


11/06/23 16:00





Expected date of discharge: 11/10/23


Attending physician: 


Duke Rivas MD





Consults: 





                                        





11/06/23 15:42


Consult Physician Routine 


   Consulting Provider: Thomas Nowak


   Consult Reason/Comments: H&P and medical


   Do you want consulting provider notified?: Yes, Notify in am











Primary care physician: 


Shaggy Thakkar








- Discharge Diagnosis(es)


(1) Major depressive disorder, recurrent severe without psychotic features


Current Visit: Yes   Status: Acute   Priority: High   





(2) Alcohol use disorder, severe, dependence


Current Visit: Yes   Status: Acute   Priority: High   





(3) Anxiety disorder


Current Visit: Yes   Status: Acute   Priority: Medium   





(4) Cannabis use disorder


Current Visit: Yes   Status: Acute   Priority: Medium   





(5) Nicotine dependence


Current Visit: Yes   Status: Acute   Priority: Low   


Hospital Course: 


Admission HPI:


Admission note was completed by writer "Patient is a 34 yo  male, 

currently lives with his grandparents in a house, unemployed, single, no kids. 

According to consultation note documented by Dr Wilder "The patient is a 

33-year-old male with a past medical history of depression, alcohol use 

disorder, and hypertension who presented to the ED with worsening depression and

suicidal ideation in the context of heavy alcohol drinking. The psychiatric team

was consulted to evaluate the patient due to reported suicidal ideation. The 

patient was evaluated in his room on the medical floor. He reported that he came

to the emergency department expecting to go to psychiatric treatment but was 

told that no beds were available in the psychiatric unit, and he may be 

transferred to another hospital. The patient endorsed that he came to the ER b

ecause he was feeling increasingly depressed and having suicidal thoughts in the

context of heavy drinking, consuming "almost 1/5 daily." He mentioned losing his

job and his inability to stop drinking alcohol. The patient reports that his 

depression started during his teenage years and may be related to feeling 

neglected or ignored by his mother, as she was always isolated. He had 

previously tried antidepressants, including Celexa and Zoloft, but did not 

experience any benefits from them. He also attempted different types of 

counseling, such as mental health therapy and substance use counseling, without 

any improvement in his depressive symptoms. The patient reported that his 

primary care physician prescribed Trazodone to help with insomnia. 


He described symptoms of depression, including a low mood, hopelessness, lack of

motivation, very low self-esteem, constant fatigue, and occasional suicidal 

ideation. The patient noted a worsening of his depression and suicidal ideation 

over the past few days due to heavy drinking, as he has been consuming 1/5 of 

liquor daily for the past few weeks. He reported feeling unsafe at home, 

especially since he lives with his grandparents, who are also heavy drinkers. 

The patient mentioned very poor sleep, difficulty focusing, and a decreased 

appetite. The patient explained that he started heavy drinking about five years 

ago and escalated to excessive drinking over the past year, with an average of 

1/5 daily, occasionally binge-drinking non-stop for two weeks. He attributed the

main trigger for his drinking to his depression, as he tries to numb his 

emotions.


The patient also reported severe anxiety with uncontrolled worry, inability to 

relax, and a constant feeling of unease. He denied current or previous symptoms 

of psychosis, hallucinations, paranoid ideation, or delusions.


The patient denied current or previous symptoms of flora, including periods of 

elevated mood, grandiosity, bursts of energy without the need for sleep, or 

impulsive/uninhibited behavior.


The patient mentioned that he had an outburst of anger at his workplace a few 

days ago, which resulted in his job loss." patient was seen today for psych 

assessment and admission today by writer. he corroborated the information given 

previously, spoke about the increased etoh and the issues at work where he was 

impulsive, aggressive. he also spoke about depression, anxiety. he claims that 

he was feeling suicidal before coming in the hospital however claims that this 

has improved.  Claimed that he was having increasing work stress, more work 

load, spoke about binge drinking alcohol for the past 2-3 years.  States that he

was drinking about a fifth of vodka a day, he claims that he withdrew from the 

alcohol on his own for 5 or 6 days at home before coming to the hospital.  

States that he does not have a history of DTs or withdrawal seizures.  He states

that he is sleeping about 6-8 hours of sleep with trazodone on board, claims 

that his appetite is fair.  States that his mood has been improving since being 

on the mental health unit.  At this time he is denying any current suicidal or 

homicidal ideations intent or plan.  Denied any auditory or visual halluci

nations."





Hospital course:


Upon admission to the unit patient was directable and agreeable to commence 

treatment and signed adult voluntary form . Patient got along well with other 

patients on the unit and followed unit protocol.  Patient was compliant with the

medications and denied any side effects throughout hospital course.  Patient was

started on Effexor XR 75 mg daily for mood/anxiety, trazodone 100 mg daily at 

bedtime when necessary for insomnia/mood, lithium 300 mg daily for mood adjunct.

 Patient spoke of his stressors and engaged in therapy both group and 

individual.  Patient was also seen by medical team for history and physical 

exam.  Due to patient's elevated LFTs, a abdominal ultrasound was ordered and 

completed on 11/7 which showed mild hepatomegaly, nonspecific pattern in the 

liver possibly hepatic steatosis or hepatocellular disease.  Patient will be 

given a follow-up comprehensive panel to be ordered for blood work in 2 weeks 

and he will be following up with his PCP. Throughout the course of the 

hospitalization patient gradually improved with regards to mood, anxiety, 

suicidal thoughts, sleep and returned back to their baseline level of 

functioning. On the day of discharge patient denied any suicidal or homicidal 

ideations intent or plan denied any auditory or visual hallucinations. Patient 

endorsed wanting to live for his health and family.  The patient denied any 

access to guns or weapons.  Patient denied any paranoia and did not endorse any 

delusions.  Patient does have a significant history of substance abuse and was 

counseled on abstaining from all substances including alcohol and marijuana. 

Patient was offered however declined inpatient substance-abuse rehab. Patient 

elected to do outpatient substance use treatment program through [facility]. Patient w

as also counseled on the medications and need for regular compliance and was 

encouraged to follow-up with their outpatient appointment for mental health and 

also for primary care.  Prior to discharge a family meeting will be arranged by 

 to answer any questions and ensure safety upon discharge. 





Mental status exam:


General Appearance: Patient appears to be mildly overweight, stated age is 

alert, pleasant, and cooperative. Patient is in no acute distress and has 

improved hygiene and grooming 


Behavior: Patient is calmly seated without any agitated behavior.


Speech: Patient's speech is fluent and nonpressured. 


Mood/Affect: Patient reports their mood is "better", affect is congruent and 

euthymic. 


Suicidality/Homicidality:  Patient denies having any suicidal or homicidal 

ideation intent or plan.  


Perceptions: Patient denies any auditory or visual hallucinations.  


Though content/process: There is no evidence of any delusional thought content 

and thought process is linear and goal-directed. more future oriented


Memory and concentration: AOX3, grossly intact for the purposes of this session.

Can spell "WORLD" backwards correctly.


Judgment and insight: improved with guarded prognosis





Impression:


Major depressive disorder, severe without psychotic features


Alcohol use disorder severe


Anxiety disorder unspecified


Cannabis use disorder


Nicotine dependence





Plan:


-Continue with discharge today as patient has improved and stabilized 

psychiatrically and is not currently an imminent threat to himself and/or 

others. Patient will remain at chronically elevated risk for harm to self and/or

others due to his impulsivity and polysubstance abuse.


-Continue medications: Effexor XR 75 mg daily for mood/90, trazodone 100 mg 

daily at bedtime when necessary for insomnia/mood, lithium 300 mg daily for mood

adjunct, patient was offered anti craving medications for alcohol use however 

patient declined them.


-Patient was counseled on the need for medication compliance and appropriate 

follow-up at mental health and also primary care for medical issues.  Patient 

verbalized understanding and agreed.


-Social work to arrange for and conduct family meeting to ensure safety upon 

discharge and answer any questions/concerns. Social work also to arrange for 

patients follow up appointments with [facility] for psychiatric care along with follow 

up with primary care provider.


-Patient counseled on abstaining from recreational drugs and marijuana and 

alcohol. Was informed/educated on the adverse effects on their physical and 

mental health. Patient verbally agreed and understood. Patient was offered 

substance abuse treatment however declined at this time.


-Patient was instructed to return to the hospital or seek immediate medical care

if their psychiatric or medical symptoms do worsen or reoccur.














                                    Allergies











Allergy/AdvReac Type Severity Reaction Status Date / Time


 


Penicillins Allergy  Rash/Hives Verified 11/03/23 18:19











                               Laboratory Results











Sodium  138 mmol/L (137-145)   11/09/23  08:05    


 


Potassium  4.6 mmol/L (3.5-5.1)   11/09/23  08:05    


 


Chloride  104 mmol/L ()   11/09/23  08:05    


 


Carbon Dioxide  25 mmol/L (22-30)   11/09/23  08:05    


 


Anion Gap  9 mmol/L  11/09/23  08:05    


 


BUN  14 mg/dL (9-20)   11/09/23  08:05    


 


Creatinine  0.88 mg/dL (0.66-1.25)   11/09/23  08:05    


 


Est GFR (CKD-EPI)AfAm  >90  (>60 ml/min/1.73 sqM)   11/09/23  08:05    


 


Est GFR (CKD-EPI)NonAf  >90  (>60 ml/min/1.73 sqM)   11/09/23  08:05    


 


Glucose  98 mg/dL (74-99)   11/09/23  08:05    


 


Calcium  9.7 mg/dL (8.4-10.2)   11/09/23  08:05    


 


Total Bilirubin  0.8 mg/dL (0.2-1.3)   11/09/23  08:05    


 


AST  147 U/L (17-59)  H  11/09/23  08:05    


 


ALT  274 U/L (4-49)  H  11/09/23  08:05    


 


Alkaline Phosphatase  59 U/L ()   11/09/23  08:05    


 


Total Protein  7.8 g/dL (6.3-8.2)   11/09/23  08:05    


 


Albumin  4.5 g/dL (3.5-5.0)   11/09/23  08:05    











                                   Vital Signs











Temp  97.5 F L  11/10/23 06:38


 


Pulse  85   11/10/23 07:55


 


Resp  12   11/10/23 06:38


 


BP  145/95   11/10/23 07:55


 


Pulse Ox  98   11/09/23 07:03


 


FiO2      











Patient Condition at Discharge: Stable





Plan - Discharge Summary


Discharge Rx Participant: Yes


New Discharge Prescriptions: 


New


   traZODone HCL [Desyrel] 50 - 100 mg PO HS PRN 30 Days #30 tab


     PRN Reason: Insomnia


   Metoprolol Tartrate [Lopressor] 25 mg PO BID 30 Days #60 tab


   Venlafaxine HCl ER [Effexor XR] 75 mg PO DAILY 30 Days #30 cap


   Lithium Carbonate 300 mg PO DAILY 30 Days #30 cap





Continue


   Nicotine 21Mg/24Hr Patch [Habitrol] 1 patch TRANSDERM DAILY 14 Days #14 patch


   Multivitamins, Thera [Multivitamin (formulary)] 1 tab PO DAILY 30 Days #30 

tab


   Famotidine [Pepcid] 20 mg PO BID 30 Days #60 tab


   Nicotine Gum (Polacrilex) [Nicorette] 2 mg BUCCAL Q2HR PRN  pieceofgum


     PRN Reason: Nicotine Cravings


   Folic Acid 1 mg PO DAILY 30 Days #30 tab





Discontinued


   LORazepam [Ativan] 1 mg PO Q12HR PRN  tab


     PRN Reason: Anxiety


   Venlafaxine HCl ER [Effexor XR] 37.5 mg PO DAILY  cap


   Fenofibrate,Micronized [Fenofibrate] 134 mg PO DAILY


   Vitamin B Complex 1 cap PO DAILY


   Mag Hydrox/Aluminum Hyd/Simeth [Mylanta Maximum Strength Liq] 10 - 20 ml PO 

ACHS PRN


     PRN Reason: Acid indigestion


   Metoprolol Succinate (ER) [Toprol Xl] 25 mg PO DAILY #30 tab


   traZODone HCL [Desyrel] 50 mg PO HS PRN


     PRN Reason: Insomnia


Discharge Medication List





Nicotine Gum (Polacrilex) [Nicorette] 2 mg BUCCAL Q2HR PRN  pieceofgum 11/06/23 

[Rx]


Famotidine [Pepcid] 20 mg PO BID 30 Days #60 tab 11/10/23 [Rx]


Folic Acid 1 mg PO DAILY 30 Days #30 tab 11/10/23 [Rx]


Lithium Carbonate 300 mg PO DAILY 30 Days #30 cap 11/10/23 [Rx]


Metoprolol Tartrate [Lopressor] 25 mg PO BID 30 Days #60 tab 11/10/23 [Rx]


Multivitamins, Thera [Multivitamin (formulary)] 1 tab PO DAILY 30 Days #30 tab 

11/10/23 [Rx]


Nicotine 21Mg/24Hr Patch [Habitrol] 1 patch TRANSDERM DAILY 14 Days #14 patch 

11/10/23 [Rx]


Venlafaxine HCl ER [Effexor XR] 75 mg PO DAILY 30 Days #30 cap 11/10/23 [Rx]


traZODone HCL [Desyrel] 50 - 100 mg PO HS PRN 30 Days #30 tab 11/10/23 [Rx]








Follow up Appointment(s)/Referral(s): 


[facility] [[facility] Week


[facility] ([facility] Mindy[facility]


[facility] [NON-STAFF] - 1 Week


Patient Instructions/Handouts:  How to Stop Smoking (DC), Depression (DC), A

lcohol Intoxication (DC)


Activity/Diet/Wound Care/Special Instructions: 


Avoid the use of street drugs and alcohol.  Take all medications as prescribed. 

When you are in need of refills on your medications, please contact your medical

provider and/or outpatient psychiatrist/provider to have this done.  Please go 

to your scheduled outpatient appointment for aftercare treatment.  If symptoms 

return or become worse, call the crisis line at [phone] and/or go to the 

nearest emergency room for evaluation.  National Suicide Hotline 068.





Per Dr. Nowak the patient needs to have his liver enzymes monitored monthly 

due to his current medication regime. 


Discharge/Stand Alone Forms:  [employer] [place]


Discharge Disposition: HOME SELF-CARE

## 2023-11-27 NOTE — ED
Psych HPI





- General


Source: patient, family


Mode of arrival: ambulatory





<Juanita Balderas P - Last Filed: 11/27/23 05:23>





- General


Source: patient


Mode of arrival: ambulatory


Limitations: no limitations





<Alexandro Topete - Last Filed: 11/27/23 10:47>





- General


Chief Complaint: Alcohol


Stated Complaint: etoh withdrawl 





- History of Present Illness


Initial Comments: 





Patient is a pleasant 33-year-old male presenting to the emergency department 

with concerns with alcohol problems.  Patient wasn't 3 W. a few weeks ago.  

Patient had suicidal thoughts at that time however none at this time.  Patient 

did drink, last drink was yesterday.  Patient states he did see some shadows 

prior to his last drink.  Patient states he feels a little bit shaky at this 

time.  No suicidal or homicidal thoughts. (Alexandro Topete)





- Related Data


                                  Previous Rx's











 Medication  Instructions  Recorded


 


Nicotine Gum (Polacrilex) 2 mg BUCCAL Q2HR PRN  pieceofgum 11/06/23





[Nicorette]  


 


Famotidine [Pepcid] 20 mg PO BID 30 Days #60 tab 11/10/23


 


Folic Acid 1 mg PO DAILY 30 Days #30 tab 11/10/23


 


Lithium Carbonate 300 mg PO DAILY 30 Days #30 cap 11/10/23


 


Metoprolol Tartrate [Lopressor] 25 mg PO BID 30 Days #60 tab 11/10/23


 


Multivitamins, Thera [Multivitamin 1 tab PO DAILY 30 Days #30 tab 11/10/23





(formulary)]  


 


Nicotine 21Mg/24Hr Patch [Habitrol] 1 patch TRANSDERM DAILY 14 Days 11/10/23





 #14 patch 


 


Venlafaxine HCl ER [Effexor XR] 75 mg PO DAILY 30 Days #30 cap 11/10/23


 


traZODone HCL [Desyrel] 50 - 100 mg PO HS PRN 30 Days #30 11/10/23





 tab 











                                    Allergies











Allergy/AdvReac Type Severity Reaction Status Date / Time


 


Penicillins Allergy  Rash/Hives Verified 11/03/23 18:19














Review of Systems


ROS Other: All systems not noted in ROS Statement are negative.





<Juanita Balderas - Last Filed: 11/27/23 05:23>


ROS Other: All systems not noted in ROS Statement are negative.


Constitutional: Denies: fever


Eyes: Denies: eye pain


ENT: Denies: ear pain


Respiratory: Denies: cough, dyspnea


Cardiovascular: Denies: chest pain


Endocrine: Denies: fatigue


Gastrointestinal: Denies: abdominal pain, vomiting


Genitourinary: Denies: dysuria


Musculoskeletal: Denies: back pain


Skin: Denies: rash


Psychiatric: Reports: anxiety





<Alexandro Topete - Last Filed: 11/27/23 10:47>


ROS Statement: 


Those systems with pertinent positive or pertinent negative responses have been 

documented in the HPI.








Past Medical History


Past Medical History: Hyperlipidemia, Hypertension


Additional Past Medical History / Comment(s): Etoh abuse


History of Any Multi-Drug Resistant Organisms: None Reported


Past Surgical History: No Surgical Hx Reported


Past Psychological History: Anxiety, Depression


Smoking Status: Current every day smoker


Past Alcohol Use History: Abuse, Daily, Heavy


Past Drug Use History: Marijuana





<Juanita Balderas P - Last Filed: 11/27/23 05:23>





General Exam


Limitations: no limitations





<Juanita Balderas P - Last Filed: 11/27/23 05:23>


Limitations: no limitations


General appearance: alert, in no apparent distress


Head exam: Present: atraumatic


Eye exam: Present: normal appearance


Neck exam: Present: normal inspection


Respiratory exam: Present: normal lung sounds bilaterally


Cardiovascular Exam: Present: regular rate, normal rhythm


GI/Abdominal exam: Present: soft.  Absent: tenderness


Extremities exam: Present: normal inspection


Neurological exam: Present: alert


Psychiatric exam: Present: normal affect, normal mood.  Absent: agitated, 

homicidal ideation, suicidal ideation





<Alexandro Topete Last Filed: 11/27/23 10:47>





Course





                                   Vital Signs











  11/27/23





  05:00


 


Temperature 98.4 F


 


Pulse Rate 109 H


 


Respiratory 20





Rate 


 


Blood Pressure 154/102


 


O2 Sat by Pulse 97





Oximetry 














Medical Decision Making





<Alexandro Topete Last Filed: 11/27/23 10:47>





- Medical Decision Making





Was pt. sent in by a medical professional or institution (, PA, NP, urgent 

care, hospital, or nursing home...) When possible be specific


@  -No


Did you speak to anyone other than the patient for history (EMS, parent, family,

 police, friend...)? What history was obtained from this source 


@  -No


Did you review nursing and triage notes (agree or disagree)?  Why? 


@  -I reviewed and agree with nursing and triage notes


Were old charts reviewed (outside hosp., previous admission, EMS record, old 

EKG, old radiological studies, urgent care reports/EKG's, nursing home records)?

 Report findings 


@  -No old charts were reviewed


Differential Diagnosis (chest pain, altered mental status, abdominal pain women,

 abdominal pain men, vaginal bleeding, weakness, fever, dyspnea, syncope, 

headache, dizziness, GI bleed, back pain, seizure, CVA, palpatations, mental 

health, musculoskeletal)? 


@  -Differential Mental Health


Depression, anxiety, bipolar, psychosis, schizophrenia, borderline personality, 

situational depression, adjustment disorder, behavioral disorder, brain tumor, 

malingering, substance abuse, encephalopathy, medication reaction, dementia, 

hypothyroidism, degenerative neurologic disorder, lupus.... This is not meant to

 be all-inclusive list


EKG interpreted by me (3pts min.).


@  -As above


X-rays interpreted by me (1pt min.).


@  -None done


CT interpreted by me (1pt min.).


@  -None done


U/S interpreted by me (1pt. min.).


@  -None done


What testing was considered but not performed or refused? (CT, X-rays, U/S, 

labs)? Why?


@  -None


What meds were considered but not given or refused? Why?


@  -None


Did you discuss the management of the patient with other professionals 

(professionals i.e. , PA, NP, lab, RT, psych nurse, , , 

teacher, , )? Give summary


@  -No


Was smoking cessation discussed for >3mins.?


@  -No


Was critical care preformed (if so, how long)?


@  -No


Were there social determinants of health that impacted care today? How? 

(Homelessness, low income, unemployed, alcoholism, drug addiction, 

transportation, low edu. Level, literacy, decrease access to med. care, senior care, 

rehab)?


@  -No


Was there de-escalation of care discussed even if they declined (Discuss DNR or 

withdrawal of care, Hospice)? DNR status


@  -No


What co-morbidities impacted this encounter? (DM, HTN, Smoking, COPD, CAD, 

Cancer, CVA, ARF, Chemo, Hep., AIDS, mental health diagnosis, sleep apnea, 

morbid obesity)?


@  -None


Was patient admitted / discharged? Hospital course, mention meds given and 

route, prescriptions, significant lab abnormalities, going to OR and other 

pertinent info.


@  -Patient does not have any suicidal or homicidal thoughts.  Patient did see 

some shadows while he was drinking.  Patient does have established follow-up 

with community mental health.  Patient is comfortable with discharge with Ativan

 and Ativan to go.  Vitals will be rechecked prior to this.


Undiagnosed new problem with uncertain prognosis?


@  -No


Drug Therapy requiring intensive monitoring for toxicity (Heparin, Nitro, 

Insulin, Cardizem)?


@  -No


Were any procedures done?


@  -No


Diagnosis/symptom?


@  -Alcohol withdrawal


Acute, or Chronic, or Acute on Chronic?


@  -Acute


Uncomplicated (without systemic symptoms) or Complicated (systemic symptoms)?


@  -default


Side effects of treatment?


@  -No


Exacerbation, Progression, or Severe Exacerbation?


@  -No


Poses a threat to life or bodily function? How? (Chest pain, USA, MI, pneumonia,

 PE, COPD, DKA, ARF, appy, cholecystitis, CVA, Diverticulitis, Homicidal, 

Suicidal, threat to staff... and all critical care pts)


@  -No (Alexandro Topete)





Disposition





<Juanita Balderas - Last Filed: 11/27/23 05:23>


Is patient prescribed a controlled substance at d/c from ED?: No


Time of Disposition: 10:47





<Alexandro Topete - Last Filed: 11/27/23 10:47>


Clinical Impression: 


 Alcohol withdrawal





Disposition: HOME SELF-CARE


Condition: Stable


Instructions (If sedation given, give patient instructions):  Alcohol Withdrawal

 (ED)


Additional Instructions: 


Please do follow-up with your primary care physician in the next day or 2 for 

recheck.  Please follow-up with [facility] mental health as planned.  Consider 

follow-up with rehab facility such as [place].  Return for increased heart 

rate, hallucinations, uncontrolled vomiting or pain, worsening or change in 

symptoms, confusion or other concerns.


Referrals: 


Shaggy Thakkar MD [Primary Care Provider] - 1-2 days